# Patient Record
Sex: FEMALE | Race: AMERICAN INDIAN OR ALASKA NATIVE
[De-identification: names, ages, dates, MRNs, and addresses within clinical notes are randomized per-mention and may not be internally consistent; named-entity substitution may affect disease eponyms.]

---

## 2018-06-13 ENCOUNTER — HOSPITAL ENCOUNTER (INPATIENT)
Dept: HOSPITAL 5 - ED | Age: 53
LOS: 7 days | Discharge: HOME | DRG: 557 | End: 2018-06-20
Attending: INTERNAL MEDICINE | Admitting: INTERNAL MEDICINE
Payer: COMMERCIAL

## 2018-06-13 DIAGNOSIS — R56.9: ICD-10-CM

## 2018-06-13 DIAGNOSIS — M62.82: Primary | ICD-10-CM

## 2018-06-13 DIAGNOSIS — E87.0: ICD-10-CM

## 2018-06-13 DIAGNOSIS — D72.829: ICD-10-CM

## 2018-06-13 DIAGNOSIS — F20.3: ICD-10-CM

## 2018-06-13 DIAGNOSIS — N30.00: ICD-10-CM

## 2018-06-13 DIAGNOSIS — G93.40: ICD-10-CM

## 2018-06-13 DIAGNOSIS — E78.5: ICD-10-CM

## 2018-06-13 DIAGNOSIS — Z83.3: ICD-10-CM

## 2018-06-13 DIAGNOSIS — Z82.49: ICD-10-CM

## 2018-06-13 DIAGNOSIS — F17.200: ICD-10-CM

## 2018-06-13 DIAGNOSIS — F31.9: ICD-10-CM

## 2018-06-13 DIAGNOSIS — R65.10: ICD-10-CM

## 2018-06-13 DIAGNOSIS — R41.0: ICD-10-CM

## 2018-06-13 DIAGNOSIS — R00.0: ICD-10-CM

## 2018-06-13 DIAGNOSIS — I11.0: ICD-10-CM

## 2018-06-13 LAB
ALBUMIN SERPL-MCNC: 4 G/DL (ref 3.9–5)
ALT SERPL-CCNC: 43 UNITS/L (ref 7–56)
BACTERIA #/AREA URNS HPF: (no result) /HPF
BASOPHILS # (AUTO): 0 K/MM3 (ref 0–0.1)
BASOPHILS NFR BLD AUTO: 0.3 % (ref 0–1.8)
BILIRUB UR QL STRIP: (no result)
BLOOD UR QL VISUAL: (no result)
BUN SERPL-MCNC: 37 MG/DL (ref 7–17)
BUN/CREAT SERPL: 31 %
CALCIUM SERPL-MCNC: 9.3 MG/DL (ref 8.4–10.2)
CRP SERPL-MCNC: 0.9 MG/DL (ref 0–1.3)
EOSINOPHIL # BLD AUTO: 0 K/MM3 (ref 0–0.4)
EOSINOPHIL NFR BLD AUTO: 0 % (ref 0–4.3)
GRAN CASTS #/AREA URNS LPF: 3 /LPF
HCT VFR BLD CALC: 39.5 % (ref 30.3–42.9)
HEMOLYSIS INDEX: 32
HGB BLD-MCNC: 12.9 GM/DL (ref 10.1–14.3)
INR PPP: 1.16 (ref 0.87–1.13)
LYMPHOCYTES # BLD AUTO: 1.3 K/MM3 (ref 1.2–5.4)
LYMPHOCYTES NFR BLD AUTO: 7.7 % (ref 13.4–35)
MCH RBC QN AUTO: 31 PG (ref 28–32)
MCHC RBC AUTO-ENTMCNC: 33 % (ref 30–34)
MCV RBC AUTO: 94 FL (ref 79–97)
MONOCYTES # (AUTO): 1.3 K/MM3 (ref 0–0.8)
MONOCYTES % (AUTO): 7.6 % (ref 0–7.3)
MUCOUS THREADS #/AREA URNS HPF: (no result) /HPF
PH UR STRIP: 5 [PH] (ref 5–7)
PLATELET # BLD: 475 K/MM3 (ref 140–440)
PROT UR STRIP-MCNC: >500 MG/DL
RBC # BLD AUTO: 4.19 M/MM3 (ref 3.65–5.03)
RBC #/AREA URNS HPF: 3 /HPF (ref 0–6)
UROBILINOGEN UR-MCNC: < 2 MG/DL (ref ?–2)
WBC #/AREA URNS HPF: 8 /HPF (ref 0–6)

## 2018-06-13 PROCEDURE — 70450 CT HEAD/BRAIN W/O DYE: CPT

## 2018-06-13 PROCEDURE — 82553 CREATINE MB FRACTION: CPT

## 2018-06-13 PROCEDURE — 82805 BLOOD GASES W/O2 SATURATION: CPT

## 2018-06-13 PROCEDURE — 82140 ASSAY OF AMMONIA: CPT

## 2018-06-13 PROCEDURE — 71045 X-RAY EXAM CHEST 1 VIEW: CPT

## 2018-06-13 PROCEDURE — G0480 DRUG TEST DEF 1-7 CLASSES: HCPCS

## 2018-06-13 PROCEDURE — 82947 ASSAY GLUCOSE BLOOD QUANT: CPT

## 2018-06-13 PROCEDURE — 86140 C-REACTIVE PROTEIN: CPT

## 2018-06-13 PROCEDURE — 82550 ASSAY OF CK (CPK): CPT

## 2018-06-13 PROCEDURE — 96361 HYDRATE IV INFUSION ADD-ON: CPT

## 2018-06-13 PROCEDURE — 84484 ASSAY OF TROPONIN QUANT: CPT

## 2018-06-13 PROCEDURE — 80178 ASSAY OF LITHIUM: CPT

## 2018-06-13 PROCEDURE — 87040 BLOOD CULTURE FOR BACTERIA: CPT

## 2018-06-13 PROCEDURE — 84160 ASSAY OF PROTEIN ANY SOURCE: CPT

## 2018-06-13 PROCEDURE — 80048 BASIC METABOLIC PNL TOTAL CA: CPT

## 2018-06-13 PROCEDURE — 82803 BLOOD GASES ANY COMBINATION: CPT

## 2018-06-13 PROCEDURE — 80053 COMPREHEN METABOLIC PANEL: CPT

## 2018-06-13 PROCEDURE — 85027 COMPLETE CBC AUTOMATED: CPT

## 2018-06-13 PROCEDURE — 80320 DRUG SCREEN QUANTALCOHOLS: CPT

## 2018-06-13 PROCEDURE — 85652 RBC SED RATE AUTOMATED: CPT

## 2018-06-13 PROCEDURE — 96365 THER/PROPH/DIAG IV INF INIT: CPT

## 2018-06-13 PROCEDURE — 86592 SYPHILIS TEST NON-TREP QUAL: CPT

## 2018-06-13 PROCEDURE — 83735 ASSAY OF MAGNESIUM: CPT

## 2018-06-13 PROCEDURE — 89051 BODY FLUID CELL COUNT: CPT

## 2018-06-13 PROCEDURE — 82962 GLUCOSE BLOOD TEST: CPT

## 2018-06-13 PROCEDURE — 4A033R1 MEASUREMENT OF ARTERIAL SATURATION, PERIPHERAL, PERCUTANEOUS APPROACH: ICD-10-PCS | Performed by: INTERNAL MEDICINE

## 2018-06-13 PROCEDURE — 96367 TX/PROPH/DG ADDL SEQ IV INF: CPT

## 2018-06-13 PROCEDURE — 74176 CT ABD & PELVIS W/O CONTRAST: CPT

## 2018-06-13 PROCEDURE — 95819 EEG AWAKE AND ASLEEP: CPT

## 2018-06-13 PROCEDURE — 74018 RADEX ABDOMEN 1 VIEW: CPT

## 2018-06-13 PROCEDURE — 85025 COMPLETE CBC W/AUTO DIFF WBC: CPT

## 2018-06-13 PROCEDURE — 87086 URINE CULTURE/COLONY COUNT: CPT

## 2018-06-13 PROCEDURE — 87116 MYCOBACTERIA CULTURE: CPT

## 2018-06-13 PROCEDURE — 81001 URINALYSIS AUTO W/SCOPE: CPT

## 2018-06-13 PROCEDURE — 93005 ELECTROCARDIOGRAM TRACING: CPT

## 2018-06-13 PROCEDURE — 93010 ELECTROCARDIOGRAM REPORT: CPT

## 2018-06-13 PROCEDURE — 86403 PARTICLE AGGLUT ANTBDY SCRN: CPT

## 2018-06-13 PROCEDURE — 96375 TX/PRO/DX INJ NEW DRUG ADDON: CPT

## 2018-06-13 PROCEDURE — 36415 COLL VENOUS BLD VENIPUNCTURE: CPT

## 2018-06-13 PROCEDURE — 85610 PROTHROMBIN TIME: CPT

## 2018-06-13 NOTE — CAT SCAN REPORT
FINAL REPORT



PROCEDURE:  CT abdomen and pelvis without contrast. 



TECHNIQUE:  Computerized axial tomography of the abdomen and

pelvis was performed without intravenous contrast. This study is

performed without intravascular contrast material and its

sensitivity for abdominal and pelvic pathology, including

neoplasms, inflammation, abscess, free fluid, thrombosis,

arterial dissection and infarction, is reduced compared with a

contrast enhanced study. 



HISTORY:  Altered mental status. 



COMPARISON:  No prior studies are available for comparison.



FINDINGS:  

The lung bases are clear. There are no pleural effusions. The

heart size is normal. The liver, pancreas and spleen are grossly

normal. The gallbladder is present. The adrenal glands are not

enlarged. Both kidneys appear normal in size and configuration.

The abdominal aorta has a normal caliber. There is no

retroperitoneal adenopathy. The unopacified gastrointestinal

tract is unremarkable. The appendix is not definitely visualized.

The bladder is unremarkable. The uterus appears enlarged. There

are some calcifications in the uterus suggesting fibroid disease.

The regional skeleton appears intact. 



IMPRESSION:  

Enlarged uterus with probable leiomyomas. No definite signs of

acute disease in the abdomen or pelvis.

## 2018-06-13 NOTE — HISTORY AND PHYSICAL REPORT
History of Present Illness


Date of examination: 06/13/18


History of present illness: 


53 year old woman with bipolar, schizophrenia was brought to the ER for 

evaluation of altered mental status. No further history is available. A lumbar 

puncture was done and the patient was treat emperically for meningitis and 

possible NMS, however poison control recommend supportive care. A ROS is 

unobtainable


EMS reported that patient is from a group home, unknown baseline mental status





PAST MEDICAL HISTORY: hypertension, diabetes, hyperlipidemia, congestive heart 

failure





PAST SURGICAL HISTORY: Aicd, back, bowel resection, b/l feet





SOCIAL HISTORY: Denies tobacco, alcohol, drugs





FAMILY HISTORY: Hypertension, diabetes

















Medications and Allergies


 Allergies











Allergy/AdvReac Type Severity Reaction Status Date / Time


 


Unable to Assess Allergy   Unverified 06/13/18 20:11











 Home Medications











 Medication  Instructions  Recorded  Confirmed  Last Taken  Type


 


LORazepam [Ativan] 1 mg PO TID 06/13/18 06/13/18 Unknown History


 


risperiDONE [Risperdal] 2 mg PO BID 06/13/18 06/13/18 Unknown History











Active Meds: 


Active Medications





Sodium Chloride (Sodium Chloride Flush Syringe 10 Ml)  10 ml IV PRN PRN


   PRN Reason: LINE FLUSH


Vancomycin HCl (Vancomycin Pharmacy To Dose)  1 each IV PKCONSULT JON











Exam





- Physical Exam


Narrative exam: 


Gen. appearance: Patient lying in bed, no apparent distress


HEENT: Normocephalic, atraumatic, pupils equally round and reactive to light, 

pinpoint, unable to do extraocular movement, and no sclericterus,. No JVD or 

thyromegaly or nodule,neck supple, no carotid bruit ,mucous membranes moist, no 

exudate or erythema


Heart: S1, S2, regular rate and rhythm


Lungs: Clear anteriorly bilaterally, breathing comfortable


Abdomen: Positive bowel sounds, soft, nondistended, no organomegaly


Extremity:no edema,  cyanosis, clubbing


Neuro: sedated



































- Constitutional


Vitals: 


 











Temp Pulse Resp BP Pulse Ox


 


 98.4 F   122 H  13   131/84   100 


 


 06/13/18 23:30  06/13/18 23:30  06/13/18 23:30  06/13/18 23:15  06/13/18 23:30














Results





- Labs


CBC & Chem 7: 


 06/13/18 20:26





 06/13/18 20:26


Labs: 


 Abnormal lab results











  06/13/18 06/13/18 06/13/18 Range/Units





  20:05 20:26 20:26 


 


WBC   17.1 H   (4.5-11.0)  K/mm3


 


RDW   12.9 L   (13.2-15.2)  %


 


Plt Count   475 H   (140-440)  K/mm3


 


Lymph % (Auto)   7.7 L   (13.4-35.0)  %


 


Mono % (Auto)   7.6 H   (0.0-7.3)  %


 


Mono #   1.3 H   (0.0-0.8)  K/mm3


 


Seg Neutrophils %   84.4 H   (40.0-70.0)  %


 


Seg Neutrophils #   14.5 H   (1.8-7.7)  K/mm3


 


PT    15.4 H  (12.2-14.9)  Sec.


 


INR    1.16 H  (0.87-1.13)  


 


POC ABG pH     (7.35-7.45)  


 


POC ABG pCO2     (35-45)  


 


Sodium     (137-145)  mmol/L


 


Chloride     ()  mmol/L


 


Carbon Dioxide     (22-30)  mmol/L


 


BUN     (7-17)  mg/dL


 


Glucose     ()  mg/dL


 


Lactic Acid     (0.7-2.0)  mmol/L


 


Magnesium     (1.7-2.3)  mg/dL


 


AST     (5-40)  units/L


 


Total Creatine Kinase     ()  units/L


 


Urine WBC (Auto)  8.0 H    (0.0-6.0)  /HPF


 


Salicylates     (2.8-20.0)  mg/dL


 


Acetaminophen     (10.0-30.0)  ug/mL














  06/13/18 06/13/18 06/13/18 Range/Units





  20:26 20:26 20:53 


 


WBC     (4.5-11.0)  K/mm3


 


RDW     (13.2-15.2)  %


 


Plt Count     (140-440)  K/mm3


 


Lymph % (Auto)     (13.4-35.0)  %


 


Mono % (Auto)     (0.0-7.3)  %


 


Mono #     (0.0-0.8)  K/mm3


 


Seg Neutrophils %     (40.0-70.0)  %


 


Seg Neutrophils #     (1.8-7.7)  K/mm3


 


PT     (12.2-14.9)  Sec.


 


INR     (0.87-1.13)  


 


POC ABG pH     (7.35-7.45)  


 


POC ABG pCO2     (35-45)  


 


Sodium  151 H    (137-145)  mmol/L


 


Chloride  112.5 H    ()  mmol/L


 


Carbon Dioxide  21 L    (22-30)  mmol/L


 


BUN  37 H    (7-17)  mg/dL


 


Glucose  159 H    ()  mg/dL


 


Lactic Acid   3.10 H*   (0.7-2.0)  mmol/L


 


Magnesium    2.40 H  (1.7-2.3)  mg/dL


 


AST  113 H    (5-40)  units/L


 


Total Creatine Kinase    5716 H  ()  units/L


 


Urine WBC (Auto)     (0.0-6.0)  /HPF


 


Salicylates     (2.8-20.0)  mg/dL


 


Acetaminophen     (10.0-30.0)  ug/mL














  06/13/18 06/13/18 06/13/18 Range/Units





  20:53 20:53 21:10 


 


WBC     (4.5-11.0)  K/mm3


 


RDW     (13.2-15.2)  %


 


Plt Count     (140-440)  K/mm3


 


Lymph % (Auto)     (13.4-35.0)  %


 


Mono % (Auto)     (0.0-7.3)  %


 


Mono #     (0.0-0.8)  K/mm3


 


Seg Neutrophils %     (40.0-70.0)  %


 


Seg Neutrophils #     (1.8-7.7)  K/mm3


 


PT     (12.2-14.9)  Sec.


 


INR     (0.87-1.13)  


 


POC ABG pH    7.460 H  (7.35-7.45)  


 


POC ABG pCO2    32.3 L  (35-45)  


 


Sodium     (137-145)  mmol/L


 


Chloride     ()  mmol/L


 


Carbon Dioxide     (22-30)  mmol/L


 


BUN     (7-17)  mg/dL


 


Glucose     ()  mg/dL


 


Lactic Acid     (0.7-2.0)  mmol/L


 


Magnesium     (1.7-2.3)  mg/dL


 


AST     (5-40)  units/L


 


Total Creatine Kinase     ()  units/L


 


Urine WBC (Auto)     (0.0-6.0)  /HPF


 


Salicylates  < 0.3 L    (2.8-20.0)  mg/dL


 


Acetaminophen   < 5.0 L   (10.0-30.0)  ug/mL














- Imaging and Cardiology


Chest x-ray: image reviewed


Abdominal x-ray: report reviewed


CT Scan - head: report reviewed


CT scan - pelvis: report reviewed





Assessment and Plan


Assessment


SIRS


Encephalopathy


Rhabdomyolysis


Hypernatremia


Bipolar


Schizophrenia





Plan


Admit to medicine


Start IV antibiotic, follow cultures


Start aggressive IV fluid


monitor cardiac enzymes


Hold further sedatives for now, s/p propofol and ativan in ER, she  is very 

sedated


consult critical care


DVT prophalaxis

## 2018-06-13 NOTE — XRAY REPORT
FINAL REPORT



PROCEDURE:  Chest. 



TECHNIQUE:  Portable AP view. 



HISTORY:  Possible sepsis. 



COMPARISON:  No prior studies are available for comparison.



FINDINGS:  

The heart and mediastinum appear normal. The lungs are clear and

well expanded. There are no pleural effusions. The soft tissues

and regional skeleton are unremarkable. 



IMPRESSION:  

Negative portable chest.

## 2018-06-13 NOTE — CAT SCAN REPORT
FINAL REPORT



PROCEDURE:  CT head without contrast. 



TECHNIQUE:  Computerized tomography of the head was performed

without contrast material.



HISTORY:  Altered mental status. 



COMPARISON:  No prior studies are available for comparison.



FINDINGS:  

There is mild cerebral atrophy. The gray matter and white matter

appear normal. There are no mass lesions. There is no

intracranial hemorrhage. The calvarium appears intact. There is

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 The paranasal

sinuses and visualized mastoid air cells are clear. 



IMPRESSION:  

Normal study of the brain for age.

## 2018-06-13 NOTE — EMERGENCY DEPARTMENT REPORT
ED General Adult HPI





- General


Chief complaint: Fever


Stated complaint: AMS


Time Seen by Provider: 06/13/18 20:32


Source: EMS (ems notes not available at time of  chart dictation), RN notes 

reviewed


Mode of arrival: Stretcher


Limitations: Altered Mental Status





- History of Present Illness


Initial comments: 





This is a 53-year-old female, unknown to this provider previously, brought to 

the hospital by EMS for complaint of altered mental status.  EMS staff not 

available for history.  No family available for history or collateral 

information.  Patient altered, he cannot describe exacerbating or relieving 

factors.  The patient is awake, does not speak, does not follow commands, and 

occasionally moans when she is examined.





Medications include lorazepam and Risperdal.


-: unknown


Radiation: other (per hpi)


Quality: other (per hpi)


Consistency: other (per hpi)


Improves with: other (per hpi)


Worsens with: other (per hpi)


Associated Symptoms: other (per hpi)


Treatments Prior to Arrival: other (per hpi)





- Related Data


 Home Medications











 Medication  Instructions  Recorded  Confirmed  Last Taken


 


LORazepam [Ativan] 1 mg PO TID 06/13/18 06/13/18 Unknown


 


risperiDONE [Risperdal] 2 mg PO BID 06/13/18 06/13/18 Unknown











 Allergies











Allergy/AdvReac Type Severity Reaction Status Date / Time


 


Unable to Assess Allergy   Unverified 06/13/18 20:11














ED Review of Systems


ROS: 


Stated complaint: AMS


Other details as noted in HPI





Comment: Unobtainable due to pts medical conditions





ED Past Medical Hx





- Past Medical History


Hx Psychiatric Treatment: Yes (schizephronia, bipolar)





- Surgical History


Past Surgical History?: No





- Social History


Smoking Status: Never Smoker





- Medications


Home Medications: 


 Home Medications











 Medication  Instructions  Recorded  Confirmed  Last Taken  Type


 


LORazepam [Ativan] 1 mg PO TID 06/13/18 06/13/18 Unknown History


 


risperiDONE [Risperdal] 2 mg PO BID 06/13/18 06/13/18 Unknown History














ED Physical Exam





- General


Limitations: Altered Mental Status


General appearance: lethargic





- Head


Head exam: Present: atraumatic, normocephalic





- Eye


Eye exam: Present: PERRL





- ENT


ENT exam: Present: mucous membranes dry





- Neck


Neck exam: Present: normal inspection.  Absent: tenderness, meningismus





- Respiratory


Respiratory exam: Absent: respiratory distress, wheezes, rales, rhonchi, stridor





- Cardiovascular


Cardiovascular Exam: Present: normal rhythm, tachycardia, normal heart sounds





- GI/Abdominal


GI/Abdominal exam: Present: soft, normal bowel sounds.  Absent: distended, 

tenderness, guarding, rebound, rigid, pulsatile mass





- Rectal


Rectal exam: Present: normal inspection





- Extremities Exam


Extremities exam: Absent: normal inspection, tenderness, pedal edema, calf 

tenderness, other (compartments are soft.  2+ pulses noted in the upper, lower 

extremities.  There is no clonus in the lower extremities.  There is 

hyperreflexia in the upper and lower extremities.  On the left lateral thigh, 

proximally, there is an area of tenderness and erythema, 4 x 6 cm, possible 

induration, no obvious abscess or fluctuance)





ED Course


 Vital Signs











  06/13/18 06/13/18 06/13/18





  19:58 20:00 20:06


 


Temperature   99.9 F H


 


Temperature [   





Intra-Procedure   





]   


 


Temperature [   





Post-Procedure]   


 


Temperature [   





Pre-Procedure]   


 


Pulse Rate 137 H 138 H 146 H


 


Pulse Rate [   





Intra-Procedure   





]   


 


Pulse Rate [   





Post-Procedure]   


 


Pulse Rate [Pre   





-Procedure]   


 


Respiratory 19 14 24





Rate   


 


Respiratory   





Rate [Intra-   





Procedure]   


 


Respiratory   





Rate [Post-   





Procedure]   


 


Respiratory   





Rate [Pre-   





Procedure]   


 


Blood Pressure  156/105 142/112


 


Blood Pressure   





[Intra-   





Procedure]   


 


Blood Pressure   





[Pre-Procedure]   


 


O2 Sat by Pulse   





Oximetry   


 


O2 Sat by Pulse   





Oximetry [   





Intra-Procedure   





]   


 


O2 Sat by Pulse   





Oximetry [Post   





-Procedure]   


 


O2 Sat by Pulse   





Oximetry [Pre-   





Procedure]   














  06/13/18 06/13/18 06/13/18





  20:10 20:15 20:30


 


Temperature   


 


Temperature [   





Intra-Procedure   





]   


 


Temperature [   





Post-Procedure]   


 


Temperature [   





Pre-Procedure]   


 


Pulse Rate  132 H 135 H


 


Pulse Rate [   





Intra-Procedure   





]   


 


Pulse Rate [   





Post-Procedure]   


 


Pulse Rate [Pre   





-Procedure]   


 


Respiratory 22 15 15





Rate   


 


Respiratory   





Rate [Intra-   





Procedure]   


 


Respiratory   





Rate [Post-   





Procedure]   


 


Respiratory   





Rate [Pre-   





Procedure]   


 


Blood Pressure  159/100 159/98


 


Blood Pressure   





[Intra-   





Procedure]   


 


Blood Pressure   





[Pre-Procedure]   


 


O2 Sat by Pulse   





Oximetry   


 


O2 Sat by Pulse   





Oximetry [   





Intra-Procedure   





]   


 


O2 Sat by Pulse   





Oximetry [Post   





-Procedure]   


 


O2 Sat by Pulse   





Oximetry [Pre-   





Procedure]   














  06/13/18 06/13/18 06/13/18





  20:40 20:45 21:00


 


Temperature   


 


Temperature [   





Intra-Procedure   





]   


 


Temperature [   





Post-Procedure]   


 


Temperature [   





Pre-Procedure]   


 


Pulse Rate  135 H 138 H


 


Pulse Rate [   





Intra-Procedure   





]   


 


Pulse Rate [   





Post-Procedure]   


 


Pulse Rate [Pre   





-Procedure]   


 


Respiratory  13 13





Rate   


 


Respiratory   





Rate [Intra-   





Procedure]   


 


Respiratory   





Rate [Post-   





Procedure]   


 


Respiratory   





Rate [Pre-   





Procedure]   


 


Blood Pressure  134/94 127/92


 


Blood Pressure   





[Intra-   





Procedure]   


 


Blood Pressure   





[Pre-Procedure]   


 


O2 Sat by Pulse 98  





Oximetry   


 


O2 Sat by Pulse   





Oximetry [   





Intra-Procedure   





]   


 


O2 Sat by Pulse   





Oximetry [Post   





-Procedure]   


 


O2 Sat by Pulse   





Oximetry [Pre-   





Procedure]   














  06/13/18 06/13/18 06/13/18





  21:10 21:15 21:43


 


Temperature   98.4 F


 


Temperature [   





Intra-Procedure   





]   


 


Temperature [   





Post-Procedure]   


 


Temperature [   





Pre-Procedure]   


 


Pulse Rate  140 H 


 


Pulse Rate [   





Intra-Procedure   





]   


 


Pulse Rate [   





Post-Procedure]   


 


Pulse Rate [Pre   





-Procedure]   


 


Respiratory  18 





Rate   


 


Respiratory   





Rate [Intra-   





Procedure]   


 


Respiratory   





Rate [Post-   





Procedure]   


 


Respiratory   





Rate [Pre-   





Procedure]   


 


Blood Pressure  133/97 


 


Blood Pressure   





[Intra-   





Procedure]   


 


Blood Pressure   





[Pre-Procedure]   


 


O2 Sat by Pulse 100  100





Oximetry   


 


O2 Sat by Pulse   





Oximetry [   





Intra-Procedure   





]   


 


O2 Sat by Pulse   





Oximetry [Post   





-Procedure]   


 


O2 Sat by Pulse   





Oximetry [Pre-   





Procedure]   














  06/13/18 06/13/18 06/13/18





  22:45 22:57 23:01


 


Temperature   


 


Temperature [   





Intra-Procedure   





]   


 


Temperature [   





Post-Procedure]   


 


Temperature [  98.4 F 





Pre-Procedure]   


 


Pulse Rate 141 H  140 H


 


Pulse Rate [   





Intra-Procedure   





]   


 


Pulse Rate [   





Post-Procedure]   


 


Pulse Rate [Pre  145 H 





-Procedure]   


 


Respiratory 12  19





Rate   


 


Respiratory   





Rate [Intra-   





Procedure]   


 


Respiratory   





Rate [Post-   





Procedure]   


 


Respiratory  16 





Rate [Pre-   





Procedure]   


 


Blood Pressure 132/90  117/68


 


Blood Pressure   





[Intra-   





Procedure]   


 


Blood Pressure  132/90 





[Pre-Procedure]   


 


O2 Sat by Pulse   





Oximetry   


 


O2 Sat by Pulse   





Oximetry [   





Intra-Procedure   





]   


 


O2 Sat by Pulse   





Oximetry [Post   





-Procedure]   


 


O2 Sat by Pulse  100 





Oximetry [Pre-   





Procedure]   














  06/13/18 06/13/18 06/13/18





  23:05 23:10 23:15


 


Temperature   


 


Temperature [ 98.2 F 98.2 F 98.2 F





Intra-Procedure   





]   


 


Temperature [   





Post-Procedure]   


 


Temperature [   





Pre-Procedure]   


 


Pulse Rate   136 H


 


Pulse Rate [ 132 H 129 H 131 H





Intra-Procedure   





]   


 


Pulse Rate [   





Post-Procedure]   


 


Pulse Rate [Pre   





-Procedure]   


 


Respiratory   13





Rate   


 


Respiratory 13 13 14





Rate [Intra-   





Procedure]   


 


Respiratory   





Rate [Post-   





Procedure]   


 


Respiratory   





Rate [Pre-   





Procedure]   


 


Blood Pressure   131/84


 


Blood Pressure 123/68 112/58 131/84





[Intra-   





Procedure]   


 


Blood Pressure   





[Pre-Procedure]   


 


O2 Sat by Pulse   





Oximetry   


 


O2 Sat by Pulse 100 100 100





Oximetry [   





Intra-Procedure   





]   


 


O2 Sat by Pulse   





Oximetry [Post   





-Procedure]   


 


O2 Sat by Pulse   





Oximetry [Pre-   





Procedure]   














  06/13/18 06/13/18 06/13/18





  23:30 23:43 23:45


 


Temperature   


 


Temperature [   





Intra-Procedure   





]   


 


Temperature [ 98.4 F  





Post-Procedure]   


 


Temperature [   





Pre-Procedure]   


 


Pulse Rate 122 H 122 H 122 H


 


Pulse Rate [   





Intra-Procedure   





]   


 


Pulse Rate [ 122 H  





Post-Procedure]   


 


Pulse Rate [Pre   





-Procedure]   


 


Respiratory 12 15 15





Rate   


 


Respiratory   





Rate [Intra-   





Procedure]   


 


Respiratory 13  





Rate [Post-   





Procedure]   


 


Respiratory   





Rate [Pre-   





Procedure]   


 


Blood Pressure 123/71 106/66 107/65


 


Blood Pressure   





[Intra-   





Procedure]   


 


Blood Pressure   





[Pre-Procedure]   


 


O2 Sat by Pulse   





Oximetry   


 


O2 Sat by Pulse   





Oximetry [   





Intra-Procedure   





]   


 


O2 Sat by Pulse 100  





Oximetry [Post   





-Procedure]   


 


O2 Sat by Pulse   





Oximetry [Pre-   





Procedure]   














  06/14/18 06/14/18 06/14/18





  00:01 00:16 00:31


 


Temperature   


 


Temperature [   





Intra-Procedure   





]   


 


Temperature [   





Post-Procedure]   


 


Temperature [   





Pre-Procedure]   


 


Pulse Rate 118 H 128 H 121 H


 


Pulse Rate [   





Intra-Procedure   





]   


 


Pulse Rate [   





Post-Procedure]   


 


Pulse Rate [Pre   





-Procedure]   


 


Respiratory 12 17 12





Rate   


 


Respiratory   





Rate [Intra-   





Procedure]   


 


Respiratory   





Rate [Post-   





Procedure]   


 


Respiratory   





Rate [Pre-   





Procedure]   


 


Blood Pressure 120/81 120/81 129/85


 


Blood Pressure   





[Intra-   





Procedure]   


 


Blood Pressure   





[Pre-Procedure]   


 


O2 Sat by Pulse   





Oximetry   


 


O2 Sat by Pulse   





Oximetry [   





Intra-Procedure   





]   


 


O2 Sat by Pulse   





Oximetry [Post   





-Procedure]   


 


O2 Sat by Pulse   





Oximetry [Pre-   





Procedure]   














  06/14/18





  00:45


 


Temperature 97.5 F L


 


Temperature [ 





Intra-Procedure 





] 


 


Temperature [ 





Post-Procedure] 


 


Temperature [ 





Pre-Procedure] 


 


Pulse Rate 123 H


 


Pulse Rate [ 





Intra-Procedure 





] 


 


Pulse Rate [ 





Post-Procedure] 


 


Pulse Rate [Pre 





-Procedure] 


 


Respiratory 11 L





Rate 


 


Respiratory 





Rate [Intra- 





Procedure] 


 


Respiratory 





Rate [Post- 





Procedure] 


 


Respiratory 





Rate [Pre- 





Procedure] 


 


Blood Pressure 122/73


 


Blood Pressure 





[Intra- 





Procedure] 


 


Blood Pressure 





[Pre-Procedure] 


 


O2 Sat by Pulse 100





Oximetry 


 


O2 Sat by Pulse 





Oximetry [ 





Intra-Procedure 





] 


 


O2 Sat by Pulse 





Oximetry [Post 





-Procedure] 


 


O2 Sat by Pulse 





Oximetry [Pre- 





Procedure] 














- Reevaluation(s)


Reevaluation #1: 





06/13/18 22:21


Differential diagnosis, including not limited to: Pneumonia, bacteremia, intra-

abdominal infection, myositis, cellulitis, urinary tract infection, neuroleptic 

malignant syndrome, dehydration, rhabdomyolysis














Assessment and plan: 53-year-old female apparently on Risperdal, with 

hyperreflexia, hypernatremia, tachycardia, leukocytosis, concerning for 

bacteremic infection versus side effect of medication.  The patient will be 

aggressively treated for all of the aforementioned.  No obvious source of 

infection is elucidated at this time, no family is available at this time for 

collateral information or history, given altered mental status leukocytosis and 

tachycardia, the patient will be treated empirically for community-acquired 

meningitis and encephalitis, and she will receive administrative consent from 

this provider for both a spinal tap and moderate sedation to acquire CSF.





I will also discuss with the [facility].  Nursing staff 

initially contacted them for recommendations, and the recommendations are 

appreciated.  It is mild pain at the patient should have broad-spectrum 

antibiotics until bacteremia and meningitis can be ruled out.

















Reevaluation #2: 





06/13/18 23:26


Patient is found to be hypernatremic, dehydrated, hypermagnesemic, and in 

rhabdomyolysis with a CK of 6000.  CT scan of the brain is negative, CT scan of 

the abdomen and pelvis is negative, the case is rediscussed with the [facility] 

[facility], Mr. AMAYA Hankmelva... [facility] agrees 

that neuroleptic malignant syndrome is the most likely diagnosis.  They do not 

recommend bromocriptine at this time.  They do recommend Ativan, fluids and 

supportive care.  They will follow in consultation and as needed.  Critical 

care physician on-call was paged.  The hospital physician on callto arrange 

admission.


Reevaluation #3: 





06/13/18 23:31


d/w Dr Waggoner, she agrees with placement to the intensive care unit.  She will 

follow in consultation.


Reevaluation #4: 





06/13/18 23:36


dr rosas accepts to her service





- Lumbar Puncture


Consent Obtained: emergent situation


Time Out Performed: Yes


Indication for Procedure: change in mental status


Patient Position: left lateral decubitus


Skin Prep: Povidone-Iodine 1%


Local Anesthetic Used: Lidocaine 1%


Amount of anesthesia used (mls): 10


Spinal Needle Gauge: Other (18 g)


Spinal Needle Length: 3.5in


Interspace Used: L4-L5


Fluid Initially Obtained: clear


Complications: none


Patient Tolerated Procedure: well





- Moderate Sedation


Indications: diagnostic imaging proced


ASA Class: III


Mallampati Airway Score: 2


Preparation: cardiac monitor applied, pulse oximeter, capnometry used, 

supplemental O2 applied, reversal agents at bedside, suction/airway equipment 

at bedside, IV secured


IV Propofol Dose (mgs): 50


Complications: none


Patient Tolerated Procedure: well


Additional Comments: 














sedation time from 11 pm to 1113 pm.





ED Medical Decision Making





- Lab Data


Result diagrams: 


 06/16/18 05:23





 06/16/18 05:23








 Vital Signs











  06/13/18 06/13/18 06/13/18





  19:58 20:00 20:06


 


Temperature   99.9 F H


 


Pulse Rate 137 H 138 H 146 H


 


Respiratory 19 14 24





Rate   


 


Blood Pressure  156/105 142/112


 


O2 Sat by Pulse   





Oximetry   














  06/13/18 06/13/18 06/13/18





  20:10 20:15 20:30


 


Temperature   


 


Pulse Rate  132 H 135 H


 


Respiratory 22 15 15





Rate   


 


Blood Pressure  159/100 159/98


 


O2 Sat by Pulse   





Oximetry   














  06/13/18 06/13/18 06/13/18





  20:40 20:45 21:00


 


Temperature   


 


Pulse Rate  135 H 138 H


 


Respiratory  13 13





Rate   


 


Blood Pressure  134/94 127/92


 


O2 Sat by Pulse 98  





Oximetry   














  06/13/18 06/13/18 06/13/18





  21:10 21:15 21:43


 


Temperature   98.4 F


 


Pulse Rate  140 H 


 


Respiratory  18 





Rate   


 


Blood Pressure  133/97 


 


O2 Sat by Pulse 100  100





Oximetry   











 Lab Results











  06/13/18 06/13/18 06/13/18 Range/Units





  20:05 20:26 20:26 


 


WBC   17.1 H   (4.5-11.0)  K/mm3


 


RBC   4.19   (3.65-5.03)  M/mm3


 


Hgb   12.9   (10.1-14.3)  gm/dl


 


Hct   39.5   (30.3-42.9)  %


 


MCV   94   (79-97)  fl


 


MCH   31   (28-32)  pg


 


MCHC   33   (30-34)  %


 


RDW   12.9 L   (13.2-15.2)  %


 


Plt Count   475 H   (140-440)  K/mm3


 


Lymph % (Auto)   7.7 L   (13.4-35.0)  %


 


Mono % (Auto)   7.6 H   (0.0-7.3)  %


 


Eos % (Auto)   0.0   (0.0-4.3)  %


 


Baso % (Auto)   0.3   (0.0-1.8)  %


 


Lymph #   1.3   (1.2-5.4)  K/mm3


 


Mono #   1.3 H   (0.0-0.8)  K/mm3


 


Eos #   0.0   (0.0-0.4)  K/mm3


 


Baso #   0.0   (0.0-0.1)  K/mm3


 


Seg Neutrophils %   84.4 H   (40.0-70.0)  %


 


Seg Neutrophils #   14.5 H   (1.8-7.7)  K/mm3


 


ESR     (0-20)  mm/Hr


 


PT    15.4 H  (12.2-14.9)  Sec.


 


INR    1.16 H  (0.87-1.13)  


 


VBG pH     (7.320-7.420)  


 


Sodium     (137-145)  mmol/L


 


Potassium     (3.6-5.0)  mmol/L


 


Chloride     ()  mmol/L


 


Carbon Dioxide     (22-30)  mmol/L


 


Anion Gap     mmol/L


 


BUN     (7-17)  mg/dL


 


Creatinine     (0.7-1.2)  mg/dL


 


Estimated GFR     ml/min


 


BUN/Creatinine Ratio     %


 


Glucose     ()  mg/dL


 


Lactic Acid     (0.7-2.0)  mmol/L


 


Calcium     (8.4-10.2)  mg/dL


 


Magnesium     (1.7-2.3)  mg/dL


 


Total Bilirubin     (0.1-1.2)  mg/dL


 


AST     (5-40)  units/L


 


ALT     (7-56)  units/L


 


Alkaline Phosphatase     ()  units/L


 


Total Creatine Kinase     ()  units/L


 


C-Reactive Protein     (0.00-1.30)  mg/dL


 


Total Protein     (6.3-8.2)  g/dL


 


Albumin     (3.9-5)  g/dL


 


Albumin/Globulin Ratio     %


 


Urine Color  Yellow    (Yellow)  


 


Urine Turbidity  Clear    (Clear)  


 


Urine pH  5.0    (5.0-7.0)  


 


Ur Specific Gravity  1.028    (1.003-1.030)  


 


Urine Protein  >500    (Negative)  mg/dL


 


Urine Glucose (UA)  50    (Negative)  mg/dL


 


Urine Ketones  20    (Negative)  mg/dL


 


Urine Blood  Mod    (Negative)  


 


Urine Nitrite  Neg    (Negative)  


 


Urine Bilirubin  Neg    (Negative)  


 


Urine Urobilinogen  < 2.0    (<2.0)  mg/dL


 


Ur Leukocyte Esterase  Neg    (Negative)  


 


Urine WBC (Auto)  8.0 H    (0.0-6.0)  /HPF


 


Urine RBC (Auto)  3.0    (0.0-6.0)  /HPF


 


U Epithel Cells (Auto)  1.0    (0-13.0)  /HPF


 


Urine Bacteria (Auto)  1+    (Negative)  /HPF


 


Granular Casts  3    /LPF


 


Urine Mucus  2+    /HPF


 


Salicylates     (2.8-20.0)  mg/dL


 


Acetaminophen     (10.0-30.0)  ug/mL


 


Lithium     (0.0-1.2)  mmol/L














  06/13/18 06/13/18 06/13/18 Range/Units





  20:26 20:26 20:26 


 


WBC     (4.5-11.0)  K/mm3


 


RBC     (3.65-5.03)  M/mm3


 


Hgb     (10.1-14.3)  gm/dl


 


Hct     (30.3-42.9)  %


 


MCV     (79-97)  fl


 


MCH     (28-32)  pg


 


MCHC     (30-34)  %


 


RDW     (13.2-15.2)  %


 


Plt Count     (140-440)  K/mm3


 


Lymph % (Auto)     (13.4-35.0)  %


 


Mono % (Auto)     (0.0-7.3)  %


 


Eos % (Auto)     (0.0-4.3)  %


 


Baso % (Auto)     (0.0-1.8)  %


 


Lymph #     (1.2-5.4)  K/mm3


 


Mono #     (0.0-0.8)  K/mm3


 


Eos #     (0.0-0.4)  K/mm3


 


Baso #     (0.0-0.1)  K/mm3


 


Seg Neutrophils %     (40.0-70.0)  %


 


Seg Neutrophils #     (1.8-7.7)  K/mm3


 


ESR     (0-20)  mm/Hr


 


PT     (12.2-14.9)  Sec.


 


INR     (0.87-1.13)  


 


VBG pH    7.387  (7.320-7.420)  


 


Sodium  151 H    (137-145)  mmol/L


 


Potassium  4.2    (3.6-5.0)  mmol/L


 


Chloride  112.5 H    ()  mmol/L


 


Carbon Dioxide  21 L    (22-30)  mmol/L


 


Anion Gap  22    mmol/L


 


BUN  37 H    (7-17)  mg/dL


 


Creatinine  1.2    (0.7-1.2)  mg/dL


 


Estimated GFR  57    ml/min


 


BUN/Creatinine Ratio  31    %


 


Glucose  159 H    ()  mg/dL


 


Lactic Acid   3.10 H*   (0.7-2.0)  mmol/L


 


Calcium  9.3    (8.4-10.2)  mg/dL


 


Magnesium     (1.7-2.3)  mg/dL


 


Total Bilirubin  0.50    (0.1-1.2)  mg/dL


 


AST  113 H    (5-40)  units/L


 


ALT  43    (7-56)  units/L


 


Alkaline Phosphatase  76    ()  units/L


 


Total Creatine Kinase     ()  units/L


 


C-Reactive Protein     (0.00-1.30)  mg/dL


 


Total Protein  7.3    (6.3-8.2)  g/dL


 


Albumin  4.0    (3.9-5)  g/dL


 


Albumin/Globulin Ratio  1.2    %


 


Urine Color     (Yellow)  


 


Urine Turbidity     (Clear)  


 


Urine pH     (5.0-7.0)  


 


Ur Specific Gravity     (1.003-1.030)  


 


Urine Protein     (Negative)  mg/dL


 


Urine Glucose (UA)     (Negative)  mg/dL


 


Urine Ketones     (Negative)  mg/dL


 


Urine Blood     (Negative)  


 


Urine Nitrite     (Negative)  


 


Urine Bilirubin     (Negative)  


 


Urine Urobilinogen     (<2.0)  mg/dL


 


Ur Leukocyte Esterase     (Negative)  


 


Urine WBC (Auto)     (0.0-6.0)  /HPF


 


Urine RBC (Auto)     (0.0-6.0)  /HPF


 


U Epithel Cells (Auto)     (0-13.0)  /HPF


 


Urine Bacteria (Auto)     (Negative)  /HPF


 


Granular Casts     /LPF


 


Urine Mucus     /HPF


 


Salicylates     (2.8-20.0)  mg/dL


 


Acetaminophen     (10.0-30.0)  ug/mL


 


Lithium     (0.0-1.2)  mmol/L














  06/13/18 06/13/18 06/13/18 Range/Units





  20:53 20:53 20:53 


 


WBC     (4.5-11.0)  K/mm3


 


RBC     (3.65-5.03)  M/mm3


 


Hgb     (10.1-14.3)  gm/dl


 


Hct     (30.3-42.9)  %


 


MCV     (79-97)  fl


 


MCH     (28-32)  pg


 


MCHC     (30-34)  %


 


RDW     (13.2-15.2)  %


 


Plt Count     (140-440)  K/mm3


 


Lymph % (Auto)     (13.4-35.0)  %


 


Mono % (Auto)     (0.0-7.3)  %


 


Eos % (Auto)     (0.0-4.3)  %


 


Baso % (Auto)     (0.0-1.8)  %


 


Lymph #     (1.2-5.4)  K/mm3


 


Mono #     (0.0-0.8)  K/mm3


 


Eos #     (0.0-0.4)  K/mm3


 


Baso #     (0.0-0.1)  K/mm3


 


Seg Neutrophils %     (40.0-70.0)  %


 


Seg Neutrophils #     (1.8-7.7)  K/mm3


 


ESR  32    (0-20)  mm/Hr


 


PT     (12.2-14.9)  Sec.


 


INR     (0.87-1.13)  


 


VBG pH     (7.320-7.420)  


 


Sodium     (137-145)  mmol/L


 


Potassium     (3.6-5.0)  mmol/L


 


Chloride     ()  mmol/L


 


Carbon Dioxide     (22-30)  mmol/L


 


Anion Gap     mmol/L


 


BUN     (7-17)  mg/dL


 


Creatinine     (0.7-1.2)  mg/dL


 


Estimated GFR     ml/min


 


BUN/Creatinine Ratio     %


 


Glucose     ()  mg/dL


 


Lactic Acid     (0.7-2.0)  mmol/L


 


Calcium     (8.4-10.2)  mg/dL


 


Magnesium   2.40 H   (1.7-2.3)  mg/dL


 


Total Bilirubin     (0.1-1.2)  mg/dL


 


AST     (5-40)  units/L


 


ALT     (7-56)  units/L


 


Alkaline Phosphatase     ()  units/L


 


Total Creatine Kinase   5716 H   ()  units/L


 


C-Reactive Protein   0.90   (0.00-1.30)  mg/dL


 


Total Protein     (6.3-8.2)  g/dL


 


Albumin     (3.9-5)  g/dL


 


Albumin/Globulin Ratio     %


 


Urine Color     (Yellow)  


 


Urine Turbidity     (Clear)  


 


Urine pH     (5.0-7.0)  


 


Ur Specific Gravity     (1.003-1.030)  


 


Urine Protein     (Negative)  mg/dL


 


Urine Glucose (UA)     (Negative)  mg/dL


 


Urine Ketones     (Negative)  mg/dL


 


Urine Blood     (Negative)  


 


Urine Nitrite     (Negative)  


 


Urine Bilirubin     (Negative)  


 


Urine Urobilinogen     (<2.0)  mg/dL


 


Ur Leukocyte Esterase     (Negative)  


 


Urine WBC (Auto)     (0.0-6.0)  /HPF


 


Urine RBC (Auto)     (0.0-6.0)  /HPF


 


U Epithel Cells (Auto)     (0-13.0)  /HPF


 


Urine Bacteria (Auto)     (Negative)  /HPF


 


Granular Casts     /LPF


 


Urine Mucus     /HPF


 


Salicylates    < 0.3 L  (2.8-20.0)  mg/dL


 


Acetaminophen     (10.0-30.0)  ug/mL


 


Lithium    0.1  (0.0-1.2)  mmol/L














  06/13/18 06/13/18 Range/Units





  20:53 21:17 


 


WBC    (4.5-11.0)  K/mm3


 


RBC    (3.65-5.03)  M/mm3


 


Hgb    (10.1-14.3)  gm/dl


 


Hct    (30.3-42.9)  %


 


MCV    (79-97)  fl


 


MCH    (28-32)  pg


 


MCHC    (30-34)  %


 


RDW    (13.2-15.2)  %


 


Plt Count    (140-440)  K/mm3


 


Lymph % (Auto)    (13.4-35.0)  %


 


Mono % (Auto)    (0.0-7.3)  %


 


Eos % (Auto)    (0.0-4.3)  %


 


Baso % (Auto)    (0.0-1.8)  %


 


Lymph #    (1.2-5.4)  K/mm3


 


Mono #    (0.0-0.8)  K/mm3


 


Eos #    (0.0-0.4)  K/mm3


 


Baso #    (0.0-0.1)  K/mm3


 


Seg Neutrophils %    (40.0-70.0)  %


 


Seg Neutrophils #    (1.8-7.7)  K/mm3


 


ESR    (0-20)  mm/Hr


 


PT    (12.2-14.9)  Sec.


 


INR    (0.87-1.13)  


 


VBG pH    (7.320-7.420)  


 


Sodium    (137-145)  mmol/L


 


Potassium    (3.6-5.0)  mmol/L


 


Chloride    ()  mmol/L


 


Carbon Dioxide    (22-30)  mmol/L


 


Anion Gap    mmol/L


 


BUN    (7-17)  mg/dL


 


Creatinine    (0.7-1.2)  mg/dL


 


Estimated GFR    ml/min


 


BUN/Creatinine Ratio    %


 


Glucose    ()  mg/dL


 


Lactic Acid   1.80  (0.7-2.0)  mmol/L


 


Calcium    (8.4-10.2)  mg/dL


 


Magnesium    (1.7-2.3)  mg/dL


 


Total Bilirubin    (0.1-1.2)  mg/dL


 


AST    (5-40)  units/L


 


ALT    (7-56)  units/L


 


Alkaline Phosphatase    ()  units/L


 


Total Creatine Kinase    ()  units/L


 


C-Reactive Protein    (0.00-1.30)  mg/dL


 


Total Protein    (6.3-8.2)  g/dL


 


Albumin    (3.9-5)  g/dL


 


Albumin/Globulin Ratio    %


 


Urine Color    (Yellow)  


 


Urine Turbidity    (Clear)  


 


Urine pH    (5.0-7.0)  


 


Ur Specific Gravity    (1.003-1.030)  


 


Urine Protein    (Negative)  mg/dL


 


Urine Glucose (UA)    (Negative)  mg/dL


 


Urine Ketones    (Negative)  mg/dL


 


Urine Blood    (Negative)  


 


Urine Nitrite    (Negative)  


 


Urine Bilirubin    (Negative)  


 


Urine Urobilinogen    (<2.0)  mg/dL


 


Ur Leukocyte Esterase    (Negative)  


 


Urine WBC (Auto)    (0.0-6.0)  /HPF


 


Urine RBC (Auto)    (0.0-6.0)  /HPF


 


U Epithel Cells (Auto)    (0-13.0)  /HPF


 


Urine Bacteria (Auto)    (Negative)  /HPF


 


Granular Casts    /LPF


 


Urine Mucus    /HPF


 


Salicylates    (2.8-20.0)  mg/dL


 


Acetaminophen  < 5.0 L   (10.0-30.0)  ug/mL


 


Lithium    (0.0-1.2)  mmol/L














- EKG Data


-: EKG Interpreted by Me


EKG shows normal: sinus rhythm


Rate: tachycardia





- EKG Data


When compared to previous EKG there are: previous EKG unavailable





06/13/18 22:21


Sinus tachycardia, normal axis, motion artifact, QTC prolonged, abnormal EKG, 

not a STEMI





- Radiology Data


Radiology results: report reviewed, image reviewed





X-ray of the chest is negative for acute disease


Critical Care Time: Yes


Critical care time in (mins) excluding proc time.: 60


Critical care attestation.: 


If time is entered above; I have spent that time in minutes in the direct care 

of this critically ill patient, excluding procedure time.





Critical Care Time: 





Critical care time includes multiple bedside evaluations, interpretation of 

laboratory studies, radiology studies, multiple and frequent bedside 

reassessments, and time spent discussing care with multiple consulting services

, including the [facility], and the hospital medicine 

service.  This does not include procedure time.





ED Disposition


Clinical Impression: 


 SIRS (systemic inflammatory response syndrome), Encephalopathy





Rhabdomyolysis


Qualifiers:


 Rhabdomyolysis type: non-traumatic Qualified Code(s): M62.82 - Rhabdomyolysis





Disposition: DC-09 OP ADMIT IP TO THIS HOSP


Is pt being admited?: Yes


Condition: Fair

## 2018-06-14 LAB
BASOPHILS # (AUTO): 0.1 K/MM3 (ref 0–0.1)
BASOPHILS CSF MANUAL: 0 %
BASOPHILS NFR BLD AUTO: 0.4 % (ref 0–1.8)
BUN SERPL-MCNC: 25 MG/DL (ref 7–17)
BUN/CREAT SERPL: 36 %
CALCIUM SERPL-MCNC: 8.1 MG/DL (ref 8.4–10.2)
EOSINOPHIL # BLD AUTO: 0 K/MM3 (ref 0–0.4)
EOSINOPHIL NFR BLD AUTO: 0.1 % (ref 0–4.3)
GLUCOSE CSF-MCNC: 94 MG/DL
HCT VFR BLD CALC: 35.3 % (ref 30.3–42.9)
HEMOLYSIS INDEX: 127
HGB BLD-MCNC: 11.4 GM/DL (ref 10.1–14.3)
LYMPHOCYTES # BLD AUTO: 3.1 K/MM3 (ref 1.2–5.4)
LYMPHOCYTES NFR BLD AUTO: 15.9 % (ref 13.4–35)
MCH RBC QN AUTO: 31 PG (ref 28–32)
MCHC RBC AUTO-ENTMCNC: 33 % (ref 30–34)
MCV RBC AUTO: 94 FL (ref 79–97)
MONOCYTES # (AUTO): 1.8 K/MM3 (ref 0–0.8)
MONOCYTES % (AUTO): 9.4 % (ref 0–7.3)
PLATELET # BLD: 339 K/MM3 (ref 140–440)
RBC # BLD AUTO: 3.77 M/MM3 (ref 3.65–5.03)
RED BLOOD CELL,CSF: 54 /MM3 (ref 0–0)
TOTAL CELLS COUNTED: 7 /MM3
WBC # CSF: 2 /MM3 (ref 1–10)

## 2018-06-14 RX ADMIN — DEXTROSE AND SODIUM CHLORIDE SCH MLS/HR: 5; .45 INJECTION, SOLUTION INTRAVENOUS at 02:41

## 2018-06-14 RX ADMIN — DEXTROSE SCH MLS/HR: 5 SOLUTION INTRAVENOUS at 10:32

## 2018-06-14 RX ADMIN — DEXTROSE AND SODIUM CHLORIDE SCH MLS/HR: 5; .45 INJECTION, SOLUTION INTRAVENOUS at 09:18

## 2018-06-14 RX ADMIN — Medication SCH ML: at 23:22

## 2018-06-14 RX ADMIN — LORAZEPAM SCH: 2 INJECTION INTRAMUSCULAR; INTRAVENOUS at 19:50

## 2018-06-14 RX ADMIN — PIPERACILLIN AND TAZOBACTAM SCH MLS/HR: 4; .5 INJECTION, POWDER, FOR SOLUTION INTRAVENOUS at 13:53

## 2018-06-14 RX ADMIN — DEXTROSE SCH MLS/HR: 5 SOLUTION INTRAVENOUS at 19:12

## 2018-06-14 RX ADMIN — Medication SCH ML: at 09:19

## 2018-06-14 RX ADMIN — ENOXAPARIN SODIUM SCH MG: 100 INJECTION SUBCUTANEOUS at 09:15

## 2018-06-14 RX ADMIN — PIPERACILLIN AND TAZOBACTAM SCH MLS/HR: 4; .5 INJECTION, POWDER, FOR SOLUTION INTRAVENOUS at 23:22

## 2018-06-14 NOTE — PROGRESS NOTE
Assessment and Plan


Assessment and plan: 


Per HPI documentation


53 year old woman with bipolar, schizophrenia was brought to the ER for 

evaluation of altered mental status. No further history is available. A lumbar 

puncture was done and the patient was treat emperically for meningitis and 

possible NMS, however poison control recommend supportive care. A ROS is 

unobtainable


EMS reported that patient is from a group home, unknown baseline mental status








Acute Encephalopathy Unkown Etiology


* CT brain negative.


* R/O Serotonin syndrome-Less likely


* Neurology and Pysch consultation.


* EEG


* lumber puncture serology negative for Bacterial Meningitis


* ?Acute cystitis


 * Await cultures


 * Increased leukocytosis today could be secondary to steroids but will monitor


SIRS


* POA, Leukocytosis, Tachycardia,


* Continue vancomycin and zosyn D#2


* cxr is negative for acute disease


* s/p one dose Acyclovir. 


Hypernatermia


* change to D5W and recheck


Mildly elevated CPK.-Possible Mild Rhabdomylysis


* EKG on admission-Sinus tachycardia, normal axis, motion artifact, QTC 

prolonged, abnormal EKG, not a STEMI per ER. Not available for my review


Bipolar disorder


Group Home Resident


Schizophrenia


* On Respiradone and ativan on MED REC


* Resume PRN ativan to prevent withdrawal





The high probability of a clinically significant, sudden or life threatening 

deterioration of the [NEURO] system(s) required my full and direct attention, 

intervention and personal management. The aggregate critical care time was [35] 

minutes. This time is in addition to time spent performing reported procedures 

but includes the following: 





[X] Data Review and interpretation 





[X] Patient assessment and monitoring of vital signs 





[X] Documentation 





[X] Medication orders and management





History


Interval history: 


Patient seen and examined still confused this morning, not following commands. 





Hospitalist Physical





- Physical exam


Narrative exam: 


Gen. appearance: Patient lying in bed, no apparent distress


HEENT: Normocephalic, atraumatic, pupils equally round and reactive to light, 

pinpoint, unable to do extraocular movement, and no sclericterus,. No JVD or 

thyromegaly or nodule,neck supple, no carotid bruit ,mucous membranes moist, no 

exudate or erythema


Heart: S1, S2, regular rate and rhythm


Lungs: Clear anteriorly bilaterally, breathing comfortable


Abdomen: Positive bowel sounds, soft, nondistended, no organomegaly


Extremity:no edema,  cyanosis, clubbing


Neuro: sedated














- Constitutional


Vitals: 


 











Temp Pulse Resp BP Pulse Ox


 


 98.5 F   106 H  12   120/78   99 


 


 06/14/18 08:00  06/14/18 08:00  06/14/18 08:00  06/14/18 08:00  06/14/18 08:00














Results





- Labs


CBC & Chem 7: 


 06/14/18 04:25





 06/14/18 04:25


Labs: 


 Laboratory Last Values











WBC  19.6 K/mm3 (4.5-11.0)  H  06/14/18  04:25    


 


RBC  3.77 M/mm3 (3.65-5.03)   06/14/18  04:25    


 


Hgb  11.4 gm/dl (10.1-14.3)   06/14/18  04:25    


 


Hct  35.3 % (30.3-42.9)   06/14/18  04:25    


 


MCV  94 fl (79-97)   06/14/18  04:25    


 


MCH  31 pg (28-32)   06/14/18  04:25    


 


MCHC  33 % (30-34)   06/14/18  04:25    


 


RDW  12.5 % (13.2-15.2)  L  06/14/18  04:25    


 


Plt Count  339 K/mm3 (140-440)   06/14/18  04:25    


 


Lymph % (Auto)  15.9 % (13.4-35.0)   06/14/18  04:25    


 


Mono % (Auto)  9.4 % (0.0-7.3)  H  06/14/18  04:25    


 


Eos % (Auto)  0.1 % (0.0-4.3)   06/14/18  04:25    


 


Baso % (Auto)  0.4 % (0.0-1.8)   06/14/18  04:25    


 


Lymph #  3.1 K/mm3 (1.2-5.4)   06/14/18  04:25    


 


Mono #  1.8 K/mm3 (0.0-0.8)  H  06/14/18  04:25    


 


Eos #  0.0 K/mm3 (0.0-0.4)   06/14/18  04:25    


 


Baso #  0.1 K/mm3 (0.0-0.1)   06/14/18  04:25    


 


Seg Neutrophils %  74.2 % (40.0-70.0)  H  06/14/18  04:25    


 


Seg Neutrophils #  14.5 K/mm3 (1.8-7.7)  H  06/14/18  04:25    


 


ESR  32 mm/Hr (0-20)   06/13/18  20:53    


 


PT  15.4 Sec. (12.2-14.9)  H  06/13/18  20:26    


 


INR  1.16  (0.87-1.13)  H  06/13/18  20:26    


 


POC ABG pH  7.460  (7.35-7.45)  H  06/13/18  21:10    


 


POC ABG pCO2  32.3  (35-45)  L  06/13/18  21:10    


 


POC ABG pO2  92  ()   06/13/18  21:10    


 


POC ABG HCO3  23.0   06/13/18  21:10    


 


POC ABG Total CO2  24   06/13/18  21:10    


 


POC ABG O2 Sat  98   06/13/18  21:10    


 


POC ABG Base Excess  -1   06/13/18  21:10    


 


VBG pH  7.387  (7.320-7.420)   06/13/18  20:26    


 


FiO2  21 %  06/13/18  21:10    


 


Sodium  154 mmol/L (137-145)  H  06/14/18  04:25    


 


Potassium  4.2 mmol/L (3.6-5.0)   06/14/18  04:25    


 


Chloride  118.0 mmol/L ()  H  06/14/18  04:25    


 


Carbon Dioxide  21 mmol/L (22-30)  L  06/14/18  04:25    


 


Anion Gap  19 mmol/L  06/14/18  04:25    


 


BUN  25 mg/dL (7-17)  H  06/14/18  04:25    


 


Creatinine  0.7 mg/dL (0.7-1.2)   06/14/18  04:25    


 


Estimated GFR  > 60 ml/min  06/14/18  04:25    


 


BUN/Creatinine Ratio  36 %  06/14/18  04:25    


 


Glucose  117 mg/dL ()  H  06/14/18  04:25    


 


Lactic Acid  1.30 mmol/L (0.7-2.0)   06/13/18  23:41    


 


Calcium  8.1 mg/dL (8.4-10.2)  L  06/14/18  04:25    


 


Magnesium  2.40 mg/dL (1.7-2.3)  H  06/13/18  20:53    


 


Total Bilirubin  0.50 mg/dL (0.1-1.2)   06/13/18  20:26    


 


AST  113 units/L (5-40)  H  06/13/18  20:26    


 


ALT  43 units/L (7-56)   06/13/18  20:26    


 


Alkaline Phosphatase  76 units/L ()   06/13/18  20:26    


 


Total Creatine Kinase  5392 units/L ()  H  06/14/18  01:39    


 


CK-MB (CK-2)  40.1 ng/mL (0.0-4.0)  H  06/14/18  01:39    


 


CK-MB (CK-2) Rel Index  0.7  (0-4)   06/14/18  01:39    


 


Troponin T  < 0.010 ng/mL (0.00-0.029)   06/14/18  01:39    


 


C-Reactive Protein  0.90 mg/dL (0.00-1.30)   06/13/18  20:53    


 


Total Protein  7.3 g/dL (6.3-8.2)   06/13/18  20:26    


 


Albumin  4.0 g/dL (3.9-5)   06/13/18  20:26    


 


Albumin/Globulin Ratio  1.2 %  06/13/18  20:26    


 


Urine Color  Yellow  (Yellow)   06/13/18  20:05    


 


Urine Turbidity  Clear  (Clear)   06/13/18  20:05    


 


Urine pH  5.0  (5.0-7.0)   06/13/18  20:05    


 


Ur Specific Gravity  1.028  (1.003-1.030)   06/13/18  20:05    


 


Urine Protein  >500 mg/dL (Negative)   06/13/18  20:05    


 


Urine Glucose (UA)  50 mg/dL (Negative)   06/13/18  20:05    


 


Urine Ketones  20 mg/dL (Negative)   06/13/18  20:05    


 


Urine Blood  Mod  (Negative)   06/13/18  20:05    


 


Urine Nitrite  Neg  (Negative)   06/13/18  20:05    


 


Urine Bilirubin  Neg  (Negative)   06/13/18  20:05    


 


Urine Urobilinogen  < 2.0 mg/dL (<2.0)   06/13/18  20:05    


 


Ur Leukocyte Esterase  Neg  (Negative)   06/13/18  20:05    


 


Urine WBC (Auto)  8.0 /HPF (0.0-6.0)  H  06/13/18  20:05    


 


Urine RBC (Auto)  3.0 /HPF (0.0-6.0)   06/13/18  20:05    


 


U Epithel Cells (Auto)  1.0 /HPF (0-13.0)   06/13/18  20:05    


 


Urine Bacteria (Auto)  1+ /HPF (Negative)   06/13/18  20:05    


 


Granular Casts  3 /LPF  06/13/18  20:05    


 


Urine Mucus  2+ /HPF  06/13/18  20:05    


 


CSF Appearance  Clear   06/13/18  23:00    


 


CSF Color  Colorless   06/13/18  23:00    


 


CSF WBC  2 /mm3 (1-10)   06/13/18  23:00    


 


CSF RBC  54 /mm3 (0-0)   06/13/18  23:00    


 


CSF Seg Neutrophils  14.3 % (0-6)   06/13/18  23:00    


 


CSF Lymphocytes %  85.7 % (40-80)   06/13/18  23:00    


 


CSF Reactive Lymphs  0 %  06/13/18  23:00    


 


CSF Monocytes %  0 % (15-45)   06/13/18  23:00    


 


CSF Eosinophils %  0 %  06/13/18  23:00    


 


CSF Basophils  0 %  06/13/18  23:00    


 


CSF Comment  Diff performed   06/13/18  23:00    


 


CSF Pathologist Review  C   06/13/18  23:00    


 


CSF Glucose  94 mg/dL  06/13/18  23:00    


 


CSF Total Protein  56 mg/dL  06/13/18  23:00    


 


Salicylates  < 0.3 mg/dL (2.8-20.0)  L  06/13/18  20:53    


 


Acetaminophen  < 5.0 ug/mL (10.0-30.0)  L  06/13/18  20:53    


 


Lithium  0.1 mmol/L (0.0-1.2)   06/13/18  20:53

## 2018-06-14 NOTE — CONSULTATION
History of Present Illness


Consult date: 06/14/18


Requesting physician: RAMIRO SIMON


History of present illness: 





PULMONARY/CCM CONSULT NOTE (Full dictation # [id number])





Please see dictated notes for full details








Medications and Allergies


 Allergies











Allergy/AdvReac Type Severity Reaction Status Date / Time


 


Unable to Assess Allergy   Unverified 06/13/18 20:11











 Home Medications











 Medication  Instructions  Recorded  Confirmed  Last Taken  Type


 


LORazepam [Ativan] 1 mg PO TID 06/13/18 06/13/18 Unknown History


 


risperiDONE [Risperdal] 2 mg PO BID 06/13/18 06/13/18 Unknown History











Active Meds: 


Active Medications





Acetaminophen (Tylenol)  650 mg PO Q4H PRN


   PRN Reason: Pain MILD(1-3)/Fever >100.5/HA


Enoxaparin Sodium (Lovenox)  40 mg SUB-Q QDAY@1000 JON


Dextrose/Sodium Chloride (D5/0.45ns)  1,000 mls @ 150 mls/hr IV AS DIRECT JON


   Last Admin: 06/14/18 02:41 Dose:  150 mls/hr


Piperacillin Sod/Tazobactam Sod (Zosyn/Ns 3.375gm/50ml)  3.375 gm in 50 mls @ 

100 mls/hr IV Q8HR JON; Protocol


   Last Infusion: 06/14/18 06:50 Dose:  Infused


Ondansetron HCl (Zofran)  4 mg IV Q4H PRN


   PRN Reason: Nausea And Vomiting


Sodium Chloride (Sodium Chloride Flush Syringe 10 Ml)  10 ml IV BID JON


Sodium Chloride (Sodium Chloride Flush Syringe 10 Ml)  10 ml IV PRN PRN


   PRN Reason: LINE FLUSH











Physical Examination


Vital signs: 


 Vital Signs











Pulse Resp


 


 137 H  19 


 


 06/13/18 19:58  06/13/18 19:58














Results





- Laboratory Findings


CBC and BMP: 


 06/14/18 04:25





 06/14/18 04:25


ABG











POC ABG pH  7.460  (7.35-7.45)  H  06/13/18  21:10    


 


POC ABG pCO2  32.3  (35-45)  L  06/13/18  21:10    


 


POC ABG pO2  92  ()   06/13/18  21:10    


 


POC ABG HCO3  23.0   06/13/18  21:10    


 


POC ABG Total CO2  24   06/13/18  21:10    


 


POC ABG O2 Sat  98   06/13/18  21:10    





PT/INR, D-dimer











PT  15.4 Sec. (12.2-14.9)  H  06/13/18  20:26    


 


INR  1.16  (0.87-1.13)  H  06/13/18  20:26    








Abnormal lab findings: 


 Abnormal Labs











  06/13/18 06/13/18 06/13/18





  20:05 20:26 20:26


 


WBC   17.1 H 


 


RDW   12.9 L 


 


Plt Count   475 H 


 


Lymph % (Auto)   7.7 L 


 


Mono % (Auto)   7.6 H 


 


Mono #   1.3 H 


 


Seg Neutrophils %   84.4 H 


 


Seg Neutrophils #   14.5 H 


 


PT    15.4 H


 


INR    1.16 H


 


POC ABG pH   


 


POC ABG pCO2   


 


Sodium   


 


Chloride   


 


Carbon Dioxide   


 


BUN   


 


Glucose   


 


Lactic Acid   


 


Calcium   


 


Magnesium   


 


AST   


 


Total Creatine Kinase   


 


CK-MB (CK-2)   


 


Urine WBC (Auto)  8.0 H  


 


Salicylates   


 


Acetaminophen   














  06/13/18 06/13/18 06/13/18





  20:26 20:26 20:53


 


WBC   


 


RDW   


 


Plt Count   


 


Lymph % (Auto)   


 


Mono % (Auto)   


 


Mono #   


 


Seg Neutrophils %   


 


Seg Neutrophils #   


 


PT   


 


INR   


 


POC ABG pH   


 


POC ABG pCO2   


 


Sodium  151 H  


 


Chloride  112.5 H  


 


Carbon Dioxide  21 L  


 


BUN  37 H  


 


Glucose  159 H  


 


Lactic Acid   3.10 H* 


 


Calcium   


 


Magnesium    2.40 H


 


AST  113 H  


 


Total Creatine Kinase    5716 H


 


CK-MB (CK-2)   


 


Urine WBC (Auto)   


 


Salicylates   


 


Acetaminophen   














  06/13/18 06/13/18 06/13/18





  20:53 20:53 21:10


 


WBC   


 


RDW   


 


Plt Count   


 


Lymph % (Auto)   


 


Mono % (Auto)   


 


Mono #   


 


Seg Neutrophils %   


 


Seg Neutrophils #   


 


PT   


 


INR   


 


POC ABG pH    7.460 H


 


POC ABG pCO2    32.3 L


 


Sodium   


 


Chloride   


 


Carbon Dioxide   


 


BUN   


 


Glucose   


 


Lactic Acid   


 


Calcium   


 


Magnesium   


 


AST   


 


Total Creatine Kinase   


 


CK-MB (CK-2)   


 


Urine WBC (Auto)   


 


Salicylates  < 0.3 L  


 


Acetaminophen   < 5.0 L 














  06/14/18 06/14/18 06/14/18





  01:39 04:25 04:25


 


WBC   19.6 H 


 


RDW   12.5 L 


 


Plt Count   


 


Lymph % (Auto)   


 


Mono % (Auto)   9.4 H 


 


[place] #   1.8 H 


 


Seg Neutrophils %   74.2 H 


 


Seg Neutrophils #   14.5 H 


 


PT   


 


INR   


 


POC ABG pH   


 


POC ABG pCO2   


 


Sodium    154 H


 


Chloride    118.0 H


 


Carbon Dioxide    21 L


 


BUN    25 H


 


Glucose    117 H


 


Lactic Acid   


 


Calcium    8.1 L


 


Magnesium   


 


AST   


 


Total Creatine Kinase  5392 H  


 


CK-MB (CK-2)  40.1 H  


 


Urine WBC (Auto)   


 


Salicylates   


 


Acetaminophen

## 2018-06-14 NOTE — CONSULTATION
History of Present Illness


Consult date: 06/14/18


Requesting physician: LAUREN RIVERS


Reason for Consult: AMS


Chief complaint: 


AMS





History of present illness: 


This 53 year old  female cannot give a history.  Per Dr. Vasquez: 

patient "with bipolar, schizophrenia was brought to the ER for evaluation of 

altered mental status. No further history is available. A lumbar puncture was 

done and the patient was treat emperically for meningitis and possible NMS, 

however poison control recommend supportive care. A ROS is unobtainable. EMS 

reported that patient is from a group home, unknown baseline mental status."  

LP was negative.





Past History


Past Medical History: other (schizophrenia, bipolar disorder)


Social history: smoking (but can't tell me how much), other (cannot answer 

questions)


Family history: other (cannot answer questions)





Medications and Allergies


 Allergies











Allergy/AdvReac Type Severity Reaction Status Date / Time


 


Unable to Assess Allergy   Unverified 06/13/18 20:11











 Home Medications











 Medication  Instructions  Recorded  Confirmed  Last Taken  Type


 


LORazepam [Ativan] 1 mg PO TID 06/13/18 06/13/18 Unknown History


 


risperiDONE [Risperdal] 2 mg PO BID 06/13/18 06/13/18 Unknown History











Active Meds: 


Active Medications





Acetaminophen (Tylenol)  650 mg PO Q4H PRN


   PRN Reason: Pain MILD(1-3)/Fever >100.5/HA


Enoxaparin Sodium (Lovenox)  40 mg SUB-Q QDAY@1000 JON


   Last Admin: 06/14/18 09:15 Dose:  40 mg


Piperacillin Sod/Tazobactam Sod (Zosyn/Ns 4.5gm/100ml)  4.5 gm in 100 mls @ 200 

mls/hr IV Q8HR JON


Dextrose (D5w)  1,000 mls @ 125 mls/hr IV AS DIRECT [facility]


   Last Admin: 06/14/18 10:32 Dose:  125 mls/hr


Lorazepam (Ativan)  0.5 mg PO TID JON


Ondansetron HCl (Zofran)  4 mg IV Q4H PRN


   PRN Reason: Nausea And Vomiting


Sodium Chloride (Sodium Chloride Flush Syringe 10 Ml)  10 ml IV BID [facility]


   Last Admin: 06/14/18 09:19 Dose:  10 ml


Sodium Chloride (Sodium Chloride Flush Syringe 10 Ml)  10 ml IV PRN PRN


   PRN Reason: LINE FLUSH











Review of Systems


ROS unobtainable: due to mental status (says yes to headache but cannot describe

)





Physical Examination





- Vital Signs


Vital Signs: 


 Vital Signs











Pulse Resp


 


 137 H  19 


 


 06/13/18 19:58  06/13/18 19:58














- Physical Exam


Narrative exam: 


General Appearance: well developed well nourished (per BMI) early 50's  

American female in NAD but incoherent with one or two words understandable but 

mostly garbled.





HEENT: atraumatic, normocephalic; no bruits, 2+ [place] without soreness, sclerae 

nonicteric.  Oropharynx pink and moist.  


Neck: stiff, no bruits.  


Heart: no murmur or extra sounds.  Rapid rate.


Extremities: no clubbing, cyanosis or edema.  2+ dorsalis pedis pulses 

bilaterally.





Neurologic Exam:


Mental Status: Awake, cannot answer orientation questions even by multichoice.  

Obeys some commands as noted.


Cranial Nerves: blinks to threat, cannot cooperate for fundus exam, PERRL, EOMs 

full to Doll's eyes without nystagmus but does not track me, facial sensation 

intact to pinprick, positive corneals, no facial grimace to command or 

supraorbital pressure, Bowen cannot be tested, palate rises symmetrically to 

phonation and gags are positive, won't shrug shoulders or protrude tongue. 


Cerebellar: finger to nose and heel to shin cannot be done, some resting tremor 

X 2.  


Sensory: seems to have decreased pinprick below knees, cannot test light touch 

or vibrations.  Double simultaneous stimulation cannot be done.  


Motor Exam Upper Extremities: won't lift arms but keeps right arm vertically in 

air with elbow one foot off bed once placed there and holds left arm above bed 

slightly once placed there.  Tone very increased with finger contractures jacinto 

left. No atrophy or fasciculations are noted visually. 


Motor Exam Lower Extremities: will not lift legs off bed.  Tone is increased on 

the right.  No atrophy or fasciculations are noted visually.  Withdraws legs to 

plantar rub X 2.  Wiggles toes to command right>left but won't kick feet off 

bed with knee supported.


Reflexes: Palmomental and jaw jerk are negative but snout is strongly positive.

  Triceps are 1+, biceps are 2 and brachioradialis are 2 bilaterally.  [place]'

s is negative bilaterally.  Knee jerks are 3 and ankle jerks are trace right 

and 0 left without clonus.  Toes are upgoing right and downgoing left to 

Babinski testing.














- Assessment


Assessment Interval: Baseline (not done since not appropriate)





Results





- Laboratory Findings


CBC and BMP: 


 06/14/18 04:25





 06/14/18 04:25


Abnormal Lab Findings: 


 Abnormal Labs











  06/13/18 06/13/18 06/13/18





  20:05 20:26 20:26


 


WBC   17.1 H 


 


RDW   12.9 L 


 


Plt Count   475 H 


 


Lymph % (Auto)   7.7 L 


 


Mono % (Auto)   7.6 H 


 


Mono #   1.3 H 


 


Seg Neutrophils %   84.4 H 


 


Seg Neutrophils #   14.5 H 


 


PT    15.4 H


 


INR    1.16 H


 


POC ABG pH   


 


POC ABG pCO2   


 


Sodium   


 


Chloride   


 


Carbon Dioxide   


 


BUN   


 


Glucose   


 


Lactic Acid   


 


Calcium   


 


Magnesium   


 


AST   


 


Total Creatine Kinase   


 


CK-MB (CK-2)   


 


Urine WBC (Auto)  8.0 H  


 


Salicylates   


 


Acetaminophen   














  06/13/18 06/13/18 06/13/18





  20:26 20:26 20:53


 


WBC   


 


RDW   


 


Plt Count   


 


Lymph % (Auto)   


 


Mono % (Auto)   


 


Mono #   


 


Seg Neutrophils %   


 


Seg Neutrophils #   


 


PT   


 


INR   


 


POC ABG pH   


 


POC ABG pCO2   


 


Sodium  151 H  


 


Chloride  112.5 H  


 


Carbon Dioxide  21 L  


 


BUN  37 H  


 


Glucose  159 H  


 


Lactic Acid   3.10 H* 


 


Calcium   


 


Magnesium    2.40 H


 


AST  113 H  


 


Total Creatine Kinase    5716 H


 


CK-MB (CK-2)   


 


Urine WBC (Auto)   


 


Salicylates   


 


Acetaminophen   














  06/13/18 06/13/18 06/13/18





  20:53 20:53 21:10


 


WBC   


 


RDW   


 


Plt Count   


 


Lymph % (Auto)   


 


Mono % (Auto)   


 


Mono #   


 


Seg Neutrophils %   


 


Seg Neutrophils #   


 


PT   


 


INR   


 


POC ABG pH    7.460 H


 


POC ABG pCO2    32.3 L


 


Sodium   


 


Chloride   


 


Carbon Dioxide   


 


BUN   


 


Glucose   


 


Lactic Acid   


 


Calcium   


 


Magnesium   


 


AST   


 


Total Creatine Kinase   


 


CK-MB (CK-2)   


 


Urine WBC (Auto)   


 


Salicylates  < 0.3 L  


 


Acetaminophen   < 5.0 L 














  06/14/18 06/14/18 06/14/18





  01:39 04:25 04:25


 


WBC   19.6 H 


 


RDW   12.5 L 


 


Plt Count   


 


Lymph % (Auto)   


 


Mono % (Auto)   9.4 H 


 


[place] #   1.8 H 


 


Seg Neutrophils %   74.2 H 


 


Seg Neutrophils #   14.5 H 


 


PT   


 


INR   


 


POC ABG pH   


 


POC ABG pCO2   


 


Sodium    154 H


 


Chloride    118.0 H


 


Carbon Dioxide    21 L


 


BUN    25 H


 


Glucose    117 H


 


Lactic Acid   


 


Calcium    8.1 L


 


Magnesium   


 


AST   


 


Total Creatine Kinase  5392 H  


 


CK-MB (CK-2)  40.1 H  


 


Urine WBC (Auto)   


 


Salicylates   


 


Acetaminophen   














Assessment and Plan


Impression:


1.  Confusion


2.  Hypernatremia





Plan:


1.  MRI brain and MRA and MR venogram all without contrast, to be done due to 

motor and reflex asymmetries and spasticity.  If negative may need cervical 

spine MRI.


2.  EEG done, normal waking.


3.  Ativan around the clock instead of prn, to avoid withdrawal.








35 min critical care time spent. 





Thank you for an interesting consultation on this unfortunate early 50's lady.

## 2018-06-14 NOTE — ELECTROENCEPHALOGRAM REPORT
Electroencephalogram EEG


Date of exam: 06/14/18


Description: 


Preliminary reading: EMG artifact, shivering artifact, otherwise normal waking.








Interpretation: 


Preliminary reading: normal waking EEG.

## 2018-06-15 LAB
BUN SERPL-MCNC: 5 MG/DL (ref 7–17)
BUN/CREAT SERPL: 8 %
CALCIUM SERPL-MCNC: 8.6 MG/DL (ref 8.4–10.2)
HEMOLYSIS INDEX: 50

## 2018-06-15 RX ADMIN — DEXTROSE SCH MLS/HR: 5 SOLUTION INTRAVENOUS at 03:00

## 2018-06-15 RX ADMIN — ENOXAPARIN SODIUM SCH MG: 100 INJECTION SUBCUTANEOUS at 10:22

## 2018-06-15 RX ADMIN — Medication SCH ML: at 10:22

## 2018-06-15 RX ADMIN — PIPERACILLIN AND TAZOBACTAM SCH MLS/HR: 4; .5 INJECTION, POWDER, FOR SOLUTION INTRAVENOUS at 06:25

## 2018-06-15 RX ADMIN — DEXTROSE SCH MLS/HR: 5 SOLUTION INTRAVENOUS at 22:07

## 2018-06-15 RX ADMIN — LORAZEPAM SCH: 2 INJECTION INTRAMUSCULAR; INTRAVENOUS at 10:11

## 2018-06-15 RX ADMIN — DEXTROSE SCH MLS/HR: 5 SOLUTION INTRAVENOUS at 12:35

## 2018-06-15 RX ADMIN — Medication SCH ML: at 22:06

## 2018-06-15 RX ADMIN — BENZTROPINE MESYLATE SCH MG: 1 INJECTION, SOLUTION INTRAMUSCULAR; INTRAVENOUS at 15:25

## 2018-06-15 NOTE — XRAY REPORT
AP ABDOMEN:



HISTORY: Feeding tube placement.



The feeding tube terminates in the distal stomach. The abdominal gas 

pattern is unremarkable.  No masses or

organomegaly is identified and there is no gross evidence of free air 

or fluid.  No significant soft tissue calcifications are noted.



IMPRESSION:

Unremarkable abdomen.

## 2018-06-15 NOTE — CONSULTATION
CONSULTING PHYSICIAN: Kendrick Aguirre MD



REASON FOR CONSULTATION:  Altered mental status, suspicion for neuroleptic

malignant syndrome.



CHIEF COMPLAINT AND HISTORY OF PRESENT ILLNESS:  The patient is a 53-year-old

-American female, as far as I can tell from the records, past medical

history is significant for bipolar disorder and schizophrenia, was brought in to

the Emergency Room for evaluation of altered mental status.  No further history

was available.  According to the emergency physician's notes, the patient was

awake, did not speak, was not following command, was moaning occasionally.  The

evaluation in the Emergency Room revealed amongst other things that she was

awake, was not following commands, but again there is no other history that we

could get, no family and no records from her reported group home.  Evaluation

revealed that she had been on Risperdal.  She had been on some lorazepam.  There

was a fear for possible neuroleptic malignant syndrome, I believe her labs

showed evidence of rhabdomyolysis and we are asked to assist with her

management.  When I stopped by to see her in the Emergency Room, she was as

described in the ER, still showed signs of the systemic inflammatory response

syndrome.  There had been no reported seizures.  There has been no high-grade

fevers.  The patient's tobacco use/abuse history is unknown.  She reportedly had

gotten a lumbar puncture done that really was an unremarkable lumbar puncture or

CSF study.  This really is as much of the history of presentation as I have.



PAST MEDICAL HISTORY:  Reported history of bipolar disorder and schizophrenia.



PAST SURGICAL HISTORY:  Unknown.



MEDICATIONS:  She was on at the time I stopped by to see were reviewed,

pertinent medications include the following:  P.r.n. Tylenol, Ativan 0.5 mg p.o.

t.i.d. scheduled, Zofran 4 mg IV q. 4 hours p.r.n. nausea and vomiting, Zosyn

4.5 grams IV q. 8 hours.  She received acyclovir 800 mg IV in the ER, she

received Rocephin in the ER, she received a 10 mg dose of Decadron x 1.  She

received a dose of vancomycin 1.25 grams.



ALLERGIES:  Unknown.



DIET:  Petite lady, acute weight loss or gain history is unknown.



FAMILY AND SOCIAL HISTORY:  Reportedly lived in a group home prior to this

admission.  Alcohol, tobacco, or illicit drug use or abuse history is unknown. 

The records from the ER mention that she is a never smoker.



REVIEW OF SYSTEMS:  Unobtainable secondary to the patient's medical and mental

condition.  Again, since she has been here, no active seizure activity.  No

hematochezia, no melena, no hematuria, no hematemesis.  No hemoptysis.



REVIEW OF SYSTEMS:  Otherwise unobtainable.



PHYSICAL EXAMINATION:

VITAL SIGNS:  At presentation in the Emergency Room, she had a low grade fever

of 99.9 degrees Fahrenheit, pulse of 137, respiratory rate of 19, blood pressure

156/105, oxygen sats were 100%, inspired oxygen concentration was not recorded. 

At the time I saw her, she was 100% on room air.  T-max this admission 100.6

degrees Fahrenheit.

GENERAL:  She is a petite-looking, middle-aged looking, -American female,

normocephalic, atraumatic, looks her stated age, resting in bed with mildly

increased work of breathing.

HEAD, EYES, EARS, NOSE AND THROAT:  She is anicteric.  No conjunctival erythema.

 Oropharynx Dry.  No gross jugular venous distention, no thyromegaly.  Grossly,

no palpable lymph nodes in the supraclavicular or submandibular lymph node

chains.

LUNGS:  Auscultation of both lung fields unremarkable.  Lungs are clear

bilaterally, slightly diminished bilateral breath sounds.

HEART:  Heart sounds 1 and 2 are heard, regular rate and rhythm at the time of

my evaluation.  No rubs or murmurs.

ABDOMEN:  Soft, flat.  Bowel sounds are positive, nontender, no palpable

hepatosplenomegaly.

EXTREMITIES:  Without overt digital clubbing, cyanosis, no pedal edema.

SKIN:  The skin was of poor turgor.  She did have an area of induration, maybe

about 8 x 4 cm on left lateral thigh, that there was some induration, it was

tender.  No fluctuance and no pus was expressed.  Otherwise, the skin is without

cellulitis or rash and no decubitus ulcers.

NEUROLOGIC:  She had spontaneous movements to all extremities.  She did have a

little bit of hyperreflexia.  No rigidity, no obvious clonus, Babinski response

is as expected, i.e., it is nonextensor.



LABORATORY DATA:  From my review are as follows:  Admission white cell count

17,100 with a hemoglobin of 12.9, hematocrit of 39.5, platelet count of 475.  No

manual differential.  INR 1.16.  Arterial blood gas showed a pH of 7.46, pCO2 of

32, pO2 of 92 on room air.  Serum sodium was 151, potassium 4.2, chloride 113,

bicarb 21, BUN was 37, creatinine 1.2, glucose 159.  Lactic acid level was

within normal limits.  AST slightly elevated at 113.  CPK was up at 5716. 

Urinalysis negative for nitrites and leukocyte esterase, 8 white cells per high

power field.  CSF studies:  2 white cells, rbc was elevated at 54.  CSF protein

was 56 and glucose was 94.  Tylenol and aspirin levels were nondetectable. 

Lithium level was within expected limits.



RADIOLOGICAL DATA:  A CT scan was done of the head.  I have reviewed the report.

 It was a normal study for age.  The CT of the abdomen and pelvis was also done.

 It showed an enlarged uterus with possible leiomyomas, no acute disease.  A

chest x-ray was also done.  I have reviewed the chest x-ray and really no acute

disease.  No acute process is obvious on the chest x-ray.



ASSESSMENT AND PLAN:

1.  Acute encephalopathy.

2.  Systemic inflammatory response syndrome.

3.  Rhabdomyolysis.

4.  Hypernatremia.

5.  Elevated serum transaminases.

6.  History of bipolar disorder.

7.  History of schizophrenia.

8.  Mild metabolic acidosis.



PLAN:  We will continue to follow her clinically in the ICU, observe her

overnight while the neurology exam is completed.  She will be put on gentle

hydration in light of the hypernatremia.  I will keep her on an isotonic

solution, but in light of the mild metabolic acidosis and rhabdo, I will

alkalinize the urine somewhat.  I will put her on D5W with 1 amp of sodium

bicarbonate per liter and run that at about  mL per hour for about 3-4

liters and reevaluate.  We will use p.r.n. Ativan for any seizure-type activity.

 She will continue empiric broad-spectrum antibiotic therapy while we wait on

the results of cultures, which include blood and urine cultures.  Aspiration

precautions will be maintained.  She will be started on gastrointestinal

prophylaxis as well as DVT prophylaxis.  Accu-Cheks are going to be q.6h. and

covered with a sliding scale.  Nutrition consult will be placed.  A feeding tube

will be passed as we cannot get her to swallow at this time.  We will have

physical therapy take a look at her.  I am not convinced we are dealing with an

overt neuroleptic malignant syndrome here, but we will certainly avoid any

medications that could make that syndrome worse or pushing her towards that. 

Flu and pneumonia vaccination will be addressed per protocol.



Thank you very much for the consult.  We will follow along.  We will make

further recommendations as picture progresses/becomes clearer.  My hope is that

as early as tomorrow, we can transfer her to regular room or at monitored bed

and out of the Intensive Care Unit.





DD: 06/14/2018 14:23

DT: 06/15/2018 01:33

JOB# [id number]  [id number]

LINDSAY/DELORES

MTDD

## 2018-06-15 NOTE — EVENT NOTE
Date: 06/15/18





Discussed with Dr. Shabazz that per his note she is improving, probably since Na 

now normal at 139.  He agreed I did not need to followup.  Signing off, call 

for any questions/changes.

## 2018-06-15 NOTE — PROGRESS NOTE
Assessment and Plan


Assessment and plan: 


Per HPI documentation


53 year old woman with bipolar, schizophrenia was brought to the ER for 

evaluation of altered mental status. No further history is available. A lumbar 

puncture was done and the patient was treat emperically for meningitis and 

possible NMS, however poison control recommend supportive care. A ROS is 

unobtainable


EMS reported that patient is from a group home, unknown baseline mental status








Acute Encephalopathy Unkown Etiology


* CT brain negative.


* R/O Serotonin syndrome-Less likely


* Neurology and Pysch consultation.input noted. EEG negative. 


* EEG


* lumber puncture serology negative for Bacterial Meningitis


* ?Acute cystitis


 * Await cultures-no growth. 


SIRS


* POA, Leukocytosis, Tachycardia,


* Continue vancomycin and zosyn D#2


* cxr is negative for acute disease


* s/p one dose Acyclovir. 


Hypernatermia


* change to D5W and recheck


Mildly elevated CPK.-Possible Mild Rhabdomylysis


* EKG on admission-Sinus tachycardia, normal axis, motion artifact, QTC 

prolonged, abnormal EKG, not a STEMI per ER. Not available for my review


Bipolar disorder


Group Home Resident


Schizophrenia


* On Respiradone and ativan on MED REC


* Resume PRN ativan to prevent withdrawal


Transfer to [facility]





The high probability of a clinically significant, sudden or life threatening 

deterioration of the [NEURO] system(s) required my full and direct attention, 

intervention and personal management. The aggregate critical care time was [35] 

minutes. This time is in addition to time spent performing reported procedures 

but includes the following: 





[X] Data Review and interpretation 





[X] Patient assessment and monitoring of vital signs 





[X] Documentation 





[X] Medication orders and management





History


Interval history: 


Patient seen and examined still confused this morning, and lathergic, but some 

improvement. following some commands. 





Hospitalist Physical





- Physical exam


Narrative exam: 


Gen. appearance: Patient lying in bed, no apparent distress


HEENT: Normocephalic, atraumatic, pupils equally round and reactive to light, 

pinpoint, unable to do extraocular movement, and no sclericterus,. No JVD or 

thyromegaly or nodule,neck supple, no carotid bruit ,mucous membranes moist, no 

exudate or erythema


Heart: S1, S2, regular rate and rhythm


Lungs: Clear anteriorly bilaterally, breathing comfortable


Abdomen: Positive bowel sounds, soft, nondistended, no organomegaly


Extremity:no edema,  cyanosis, clubbing


Neuro: lathargic, not answering questions but mumbles some incomprehensible 

words. follows some commands














- Constitutional


Vitals: 


 











Temp Pulse Resp BP Pulse Ox


 


 97.8 F   95 H  22   125/95   100 


 


 06/15/18 03:40  06/15/18 15:50  06/15/18 16:00  06/15/18 16:00  06/15/18 16:00














Results





- Labs


CBC & Chem 7: 


 06/14/18 04:25





 06/15/18 15:32


Labs: 


 Laboratory Last Values











WBC  19.6 K/mm3 (4.5-11.0)  H  06/14/18  04:25    


 


RBC  3.77 M/mm3 (3.65-5.03)   06/14/18  04:25    


 


Hgb  11.4 gm/dl (10.1-14.3)   06/14/18  04:25    


 


Hct  35.3 % (30.3-42.9)   06/14/18  04:25    


 


MCV  94 fl (79-97)   06/14/18  04:25    


 


MCH  31 pg (28-32)   06/14/18  04:25    


 


MCHC  33 % (30-34)   06/14/18  04:25    


 


RDW  12.5 % (13.2-15.2)  L  06/14/18  04:25    


 


Plt Count  339 K/mm3 (140-440)   06/14/18  04:25    


 


Lymph % (Auto)  15.9 % (13.4-35.0)   06/14/18  04:25    


 


Mono % (Auto)  9.4 % (0.0-7.3)  H  06/14/18  04:25    


 


Eos % (Auto)  0.1 % (0.0-4.3)   06/14/18  04:25    


 


Baso % (Auto)  0.4 % (0.0-1.8)   06/14/18  04:25    


 


Lymph #  3.1 K/mm3 (1.2-5.4)   06/14/18  04:25    


 


Mono #  1.8 K/mm3 (0.0-0.8)  H  06/14/18  04:25    


 


Eos #  0.0 K/mm3 (0.0-0.4)   06/14/18  04:25    


 


Baso #  0.1 K/mm3 (0.0-0.1)   06/14/18  04:25    


 


Seg Neutrophils %  74.2 % (40.0-70.0)  H  06/14/18  04:25    


 


Seg Neutrophils #  14.5 K/mm3 (1.8-7.7)  H  06/14/18  04:25    


 


ESR  32 mm/Hr (0-20)   06/13/18  20:53    


 


PT  15.4 Sec. (12.2-14.9)  H  06/13/18  20:26    


 


INR  1.16  (0.87-1.13)  H  06/13/18  20:26    


 


POC ABG pH  7.460  (7.35-7.45)  H  06/13/18  21:10    


 


POC ABG pCO2  32.3  (35-45)  L  06/13/18  21:10    


 


POC ABG pO2  92  ()   06/13/18  21:10    


 


POC ABG HCO3  23.0   06/13/18  21:10    


 


POC ABG Total CO2  24   06/13/18  21:10    


 


POC ABG O2 Sat  98   06/13/18  21:10    


 


POC ABG Base Excess  -1   06/13/18  21:10    


 


VBG pH  7.387  (7.320-7.420)   06/13/18  20:26    


 


FiO2  21 %  06/13/18  21:10    


 


Sodium  139 mmol/L (137-145)  D 06/15/18  15:32    


 


Potassium  3.4 mmol/L (3.6-5.0)  L  06/15/18  15:32    


 


Chloride  102.1 mmol/L ()   06/15/18  15:32    


 


Carbon Dioxide  25 mmol/L (22-30)   06/15/18  15:32    


 


Anion Gap  15 mmol/L  06/15/18  15:32    


 


BUN  5 mg/dL (7-17)  L  06/15/18  15:32    


 


Creatinine  0.6 mg/dL (0.7-1.2)  L  06/15/18  15:32    


 


Estimated GFR  > 60 ml/min  06/15/18  15:32    


 


BUN/Creatinine Ratio  8 %  06/15/18  15:32    


 


Glucose  98 mg/dL ()   06/15/18  15:32    


 


POC Glucose  93  ()   06/15/18  11:08    


 


Lactic Acid  1.30 mmol/L (0.7-2.0)   06/13/18  23:41    


 


Calcium  8.6 mg/dL (8.4-10.2)   06/15/18  15:32    


 


Magnesium  2.40 mg/dL (1.7-2.3)  H  06/13/18  20:53    


 


Total Bilirubin  0.50 mg/dL (0.1-1.2)   06/13/18  20:26    


 


AST  113 units/L (5-40)  H  06/13/18  20:26    


 


ALT  43 units/L (7-56)   06/13/18  20:26    


 


Alkaline Phosphatase  76 units/L ()   06/13/18  20:26    


 


Total Creatine Kinase  3884 units/L ()  H  06/14/18  13:00    


 


CK-MB (CK-2)  21.0 ng/mL (0.0-4.0)  H  06/14/18  13:00    


 


CK-MB (CK-2) Rel Index  0.5  (0-4)   06/14/18  13:00    


 


Troponin T  < 0.010 ng/mL (0.00-0.029)   06/14/18  13:00    


 


C-Reactive Protein  0.90 mg/dL (0.00-1.30)   06/13/18  20:53    


 


Total Protein  7.3 g/dL (6.3-8.2)   06/13/18  20:26    


 


Albumin  4.0 g/dL (3.9-5)   06/13/18  20:26    


 


Albumin/Globulin Ratio  1.2 %  06/13/18  20:26    


 


Urine Color  Yellow  (Yellow)   06/13/18  20:05    


 


Urine Turbidity  Clear  (Clear)   06/13/18  20:05    


 


Urine pH  5.0  (5.0-7.0)   06/13/18  20:05    


 


Ur Specific Gravity  1.028  (1.003-1.030)   06/13/18  20:05    


 


Urine Protein  >500 mg/dL (Negative)   06/13/18  20:05    


 


Urine Glucose (UA)  50 mg/dL (Negative)   06/13/18  20:05    


 


Urine Ketones  20 mg/dL (Negative)   06/13/18  20:05    


 


Urine Blood  Mod  (Negative)   06/13/18  20:05    


 


Urine Nitrite  Neg  (Negative)   06/13/18  20:05    


 


Urine Bilirubin  Neg  (Negative)   06/13/18  20:05    


 


Urine Urobilinogen  < 2.0 mg/dL (<2.0)   06/13/18  20:05    


 


Ur Leukocyte Esterase  Neg  (Negative)   06/13/18  20:05    


 


Urine WBC (Auto)  8.0 /HPF (0.0-6.0)  H  06/13/18  20:05    


 


Urine RBC (Auto)  3.0 /HPF (0.0-6.0)   06/13/18  20:05    


 


U Epithel Cells (Auto)  1.0 /HPF (0-13.0)   06/13/18  20:05    


 


Urine Bacteria (Auto)  1+ /HPF (Negative)   06/13/18  20:05    


 


Granular Casts  3 /LPF  06/13/18  20:05    


 


Urine Mucus  2+ /HPF  06/13/18  20:05    


 


CSF Appearance  Clear   06/13/18  23:00    


 


CSF Color  Colorless   06/13/18  23:00    


 


CSF WBC  2 /mm3 (1-10)   06/13/18  23:00    


 


CSF RBC  54 /mm3 (0-0)   06/13/18  23:00    


 


CSF Seg Neutrophils  14.3 % (0-6)   06/13/18  23:00    


 


CSF Lymphocytes %  85.7 % (40-80)   06/13/18  23:00    


 


CSF Reactive Lymphs  0 %  06/13/18  23:00    


 


CSF Monocytes %  0 % (15-45)   06/13/18  23:00    


 


CSF Eosinophils %  0 %  06/13/18  23:00    


 


CSF Basophils  0 %  06/13/18  23:00    


 


CSF Comment  Diff performed   06/13/18  23:00    


 


CSF Pathologist Review  C   06/13/18  23:00    


 


CSF Glucose  94 mg/dL  06/13/18  23:00    


 


CSF Total Protein  56 mg/dL  06/13/18  23:00    


 


Salicylates  < 0.3 mg/dL (2.8-20.0)  L  06/13/18  20:53    


 


Acetaminophen  < 5.0 ug/mL (10.0-30.0)  L  06/13/18  20:53    


 


Lithium  0.1 mmol/L (0.0-1.2)   06/13/18  20:53

## 2018-06-15 NOTE — PROGRESS NOTE
Assessment and Plan








Acute encephalopathy.


Systemic inflammatory response syndrome.


Rhabdomyolysis.


Hypernatremia.


Elevated serum transaminases.


History of bipolar disorder.


History of schizophrenia.


Mild metabolic acidosis





- encephalopathy improving


- stop free water as hypernatremia resolved


- place feeding tube and begin enteral nutrition


- swallow evaluation


- aspiration precautions


- get psychiatry evaluation and resume home psych meds (per daughter not too 

far from baseline)


- de-escalate antibiotics (will stop and observe clinically with CRP of 0.9)


- continue GI & VTE prophylaxis


- transfer to medical floor


- PT/OT to evaluate and treat





... care plan discussed with daughter in room





.... 35'








Subjective


Date of service: 06/15/18


Principal diagnosis: Acute Encephalopathy; SIRS; Hypernatremia; Rhabdomyolysis; 

Bipolar Disorder


Interval history: 





Patient is seen today for: Acute Encephalopathy; SIRS; Hypernatremia; 

Rhabdomyolysis; Bipolar Disorder





Seen and examined at bedside; 24hour events reviewed; nursing and respiratory 

care staff consulted; no adverse overnight events reported to me; resting in bed

; daughter visiting; a little more alert / responsive; No N/V/F/C; not 

cooperating with feeding








Objective


 Vital Signs - 12hr











  06/15/18 06/15/18 06/15/18





  00:00 00:10 00:20


 


Temperature   


 


Pulse Rate 110 H 111 H 96 H


 


Pulse Rate [   





From Monitor]   


 


Pulse Rate [   





None]   


 


Respiratory 15 16 13





Rate   


 


Blood Pressure  143/100 143/100


 


O2 Sat by Pulse 92 99 100





Oximetry   














  06/15/18 06/15/18 06/15/18





  00:30 00:40 00:50


 


Temperature   


 


Pulse Rate 109 H 107 H 102 H


 


Pulse Rate [   





From Monitor]   


 


Pulse Rate [   





None]   


 


Respiratory 17 14 14





Rate   


 


Blood Pressure 143/100 143/100 143/100


 


O2 Sat by Pulse 100 100 99





Oximetry   














  06/15/18 06/15/18 06/15/18





  01:00 01:10 01:20


 


Temperature   


 


Pulse Rate 111 H 106 H 110 H


 


Pulse Rate [   





From Monitor]   


 


Pulse Rate [   





None]   


 


Respiratory 19 15 14





Rate   


 


Blood Pressure 131/97 131/97 131/97


 


O2 Sat by Pulse 100 100 96





Oximetry   














  06/15/18 06/15/18 06/15/18





  01:30 01:40 01:50


 


Temperature   


 


Pulse Rate 106 H 102 H 102 H


 


Pulse Rate [   





From Monitor]   


 


Pulse Rate [   





None]   


 


Respiratory 17 14 15





Rate   


 


Blood Pressure 131/97 131/97 131/97


 


O2 Sat by Pulse 99 100 99





Oximetry   














  06/15/18 06/15/18 06/15/18





  02:00 02:10 02:20


 


Temperature   


 


Pulse Rate 99 H 91 H 93 H


 


Pulse Rate [   





From Monitor]   


 


Pulse Rate [   





None]   


 


Respiratory 15 13 14





Rate   


 


Blood Pressure 112/66 112/66 112/66


 


O2 Sat by Pulse 99 99 99





Oximetry   














  06/15/18 06/15/18 06/15/18





  02:30 02:40 02:50


 


Temperature   


 


Pulse Rate 97 H 94 H 113 H


 


Pulse Rate [   





From Monitor]   


 


Pulse Rate [   





None]   


 


Respiratory 14 13 16





Rate   


 


Blood Pressure 112/66 112/66 112/66


 


O2 Sat by Pulse 98 98 99





Oximetry   














  06/15/18 06/15/18 06/15/18





  03:00 03:10 03:20


 


Temperature   


 


Pulse Rate 121 H 89 98 H


 


Pulse Rate [   





From Monitor]   


 


Pulse Rate [   





None]   


 


Respiratory 18 16 11 L





Rate   


 


Blood Pressure 112/66 154/103 154/103


 


O2 Sat by Pulse 99 100 99





Oximetry   














  06/15/18 06/15/18 06/15/18





  03:30 03:40 03:50


 


Temperature  97.8 F 


 


Pulse Rate 98 H 99 H 96 H


 


Pulse Rate [   





From Monitor]   


 


Pulse Rate [   





None]   


 


Respiratory 15 14 16





Rate   


 


Blood Pressure 154/103 154/103 154/103


 


O2 Sat by Pulse 98 98 98





Oximetry   














  06/15/18 06/15/18 06/15/18





  04:00 04:05 04:10


 


Temperature   


 


Pulse Rate 84  96 H


 


Pulse Rate [   





From Monitor]   


 


Pulse Rate [  91 H 





None]   


 


Respiratory 10 L 16 13





Rate   


 


Blood Pressure 109/74 109/74 109/74


 


O2 Sat by Pulse 100 100 97





Oximetry   














  06/15/18 06/15/18 06/15/18





  04:20 04:30 04:40


 


Temperature   


 


Pulse Rate 112 H 91 H 97 H


 


Pulse Rate [   





From Monitor]   


 


Pulse Rate [   





None]   


 


Respiratory 15 14 13





Rate   


 


Blood Pressure 109/74 109/74 109/74


 


O2 Sat by Pulse 99 99 99





Oximetry   














  06/15/18 06/15/18 06/15/18





  04:50 05:00 05:10


 


Temperature   


 


Pulse Rate 98 H 89 95 H


 


Pulse Rate [   





From Monitor]   


 


Pulse Rate [   





None]   


 


Respiratory 16 14 14





Rate   


 


Blood Pressure 109/74 83/64 83/64


 


O2 Sat by Pulse 98 99 99





Oximetry   














  06/15/18 06/15/18 06/15/18





  05:20 05:30 05:40


 


Temperature   


 


Pulse Rate 88 95 H 91 H


 


Pulse Rate [   





From Monitor]   


 


Pulse Rate [   





None]   


 


Respiratory 10 L 9 L 13





Rate   


 


Blood Pressure 83/64 83/64 83/64


 


O2 Sat by Pulse 99 99 99





Oximetry   














  06/15/18 06/15/18 06/15/18





  05:41 05:50 06:00


 


Temperature   


 


Pulse Rate 89 102 H 100 H


 


Pulse Rate [   





From Monitor]   


 


Pulse Rate [   





None]   


 


Respiratory 19 13 12





Rate   


 


Blood Pressure  83/64 86/71


 


O2 Sat by Pulse 100 98 98





Oximetry   














  06/15/18 06/15/18 06/15/18





  06:10 06:20 06:30


 


Temperature   


 


Pulse Rate 96 H 91 H 96 H


 


Pulse Rate [   





From Monitor]   


 


Pulse Rate [   





None]   


 


Respiratory 14 9 L 13





Rate   


 


Blood Pressure 86/71 86/71 86/71


 


O2 Sat by Pulse 99 100 99





Oximetry   














  06/15/18 06/15/18 06/15/18





  06:40 06:50 07:00


 


Temperature   


 


Pulse Rate 96 H 93 H 112 H


 


Pulse Rate [   





From Monitor]   


 


Pulse Rate [   





None]   


 


Respiratory 14 14 20





Rate   


 


Blood Pressure 86/71 86/71 135/98


 


O2 Sat by Pulse 99 99 97





Oximetry   














  06/15/18 06/15/18 06/15/18





  07:10 07:20 07:30


 


Temperature   


 


Pulse Rate 86 81 83


 


Pulse Rate [   





From Monitor]   


 


Pulse Rate [   





None]   


 


Respiratory 13 14 12





Rate   


 


Blood Pressure 135/98 86/71 86/71


 


O2 Sat by Pulse 100 100 100





Oximetry   














  06/15/18 06/15/18 06/15/18





  07:40 07:50 08:00


 


Temperature   


 


Pulse Rate 88 93 H 89


 


Pulse Rate [   112 H





From Monitor]   


 


Pulse Rate [   





None]   


 


Respiratory 16 12 12





Rate   


 


Blood Pressure 86/71 86/71 93/63


 


O2 Sat by Pulse 99 98 98





Oximetry   














  06/15/18 06/15/18 06/15/18





  08:10 08:20 08:30


 


Temperature   


 


Pulse Rate 108 H 89 89


 


Pulse Rate [   





From Monitor]   


 


Pulse Rate [   





None]   


 


Respiratory 17 9 L 11 L





Rate   


 


Blood Pressure 93/63 93/63 93/63


 


O2 Sat by Pulse 100 100 100





Oximetry   














  06/15/18 06/15/18 06/15/18





  08:40 08:50 09:00


 


Temperature   


 


Pulse Rate 93 H 87 92 H


 


Pulse Rate [   





From Monitor]   


 


Pulse Rate [   





None]   


 


Respiratory 11 L 11 L 12





Rate   


 


Blood Pressure 93/63 93/63 92/62


 


O2 Sat by Pulse 100 100 





Oximetry   














  06/15/18 06/15/18 06/15/18





  09:10 09:20 09:30


 


Temperature   


 


Pulse Rate 84 86 94 H


 


Pulse Rate [   





From Monitor]   


 


Pulse Rate [   





None]   


 


Respiratory 11 L 12 17





Rate   


 


Blood Pressure 92/62 92/62 92/62


 


O2 Sat by Pulse 100 100 100





Oximetry   














  06/15/18 06/15/18 06/15/18





  09:40 09:50 10:00


 


Temperature   


 


Pulse Rate 91 H 96 H 85


 


Pulse Rate [   96 H





From Monitor]   


 


Pulse Rate [   





None]   


 


Respiratory 21 12 9 L





Rate   


 


Blood Pressure 92/62 92/62 101/71


 


O2 Sat by Pulse 100 100 100





Oximetry   














  06/15/18 06/15/18 06/15/18





  10:10 10:20 10:30


 


Temperature   


 


Pulse Rate 87 87 89


 


Pulse Rate [   





From Monitor]   


 


Pulse Rate [   





None]   


 


Respiratory 11 L 11 L 12





Rate   


 


Blood Pressure 101/71 101/71 101/71


 


O2 Sat by Pulse 100 100 100





Oximetry   














  06/15/18 06/15/18 06/15/18





  10:40 10:50 11:00


 


Temperature   


 


Pulse Rate 91 H 88 95 H


 


Pulse Rate [   





From Monitor]   


 


Pulse Rate [   





None]   


 


Respiratory 14 13 11 L





Rate   


 


Blood Pressure  101/71 107/78


 


O2 Sat by Pulse 99 100 99





Oximetry   














  06/15/18





  11:10


 


Temperature 


 


Pulse Rate 87


 


Pulse Rate [ 





From Monitor] 


 


Pulse Rate [ 





None] 


 


Respiratory 10 L





Rate 


 


Blood Pressure 107/78


 


O2 Sat by Pulse 100





Oximetry 











Constitutional: no acute distress, other (middle aged AAF; normocephalic and 

atraumatic in no acute respiratory distress)


Eyes: non-icteric


ENT: oropharynx moist, other (mallampatti II)


Neck: supple, no lymphadenopathy, no JVD, other (no thyromegaly)


Effort: mildly labored


Ascultation: Bilateral: clear


Percussion: Bilateral: not dull


Cardiovascular: regular rate and rhythm, other (no R/M)


Gastrointestinal: normoactive bowel sounds, soft, non-tender, non-distended, 

other (No HSM)


Integumentary: other (left thigh tender indurated 4X6 cm area improving)


Extremities: no cyanosis, no edema, pulses normal, no ischemia or petechiae


Neurologic: non-focal exam, pupils equal and round, CN II-XII normal


Psychiatric: other (flat affect)


CBC and BMP: 


 06/16/18 05:23





 06/16/18 05:23


ABG, PT/INR, D-dimer: 


ABG











POC ABG pH  7.460  (7.35-7.45)  H  06/13/18  21:10    


 


POC ABG pCO2  32.3  (35-45)  L  06/13/18  21:10    


 


POC ABG pO2  92  ()   06/13/18  21:10    


 


POC ABG HCO3  23.0   06/13/18  21:10    


 


POC ABG Total CO2  24   06/13/18  21:10    


 


POC ABG O2 Sat  98   06/13/18  21:10    





PT/INR, D-dimer











PT  15.4 Sec. (12.2-14.9)  H  06/13/18  20:26    


 


INR  1.16  (0.87-1.13)  H  06/13/18  20:26    








Abnormal lab findings: 


 Abnormal Labs











  06/13/18 06/13/18 06/13/18





  20:05 20:26 20:26


 


WBC   17.1 H 


 


RDW   12.9 L 


 


Plt Count   475 H 


 


Lymph % (Auto)   7.7 L 


 


Mono % (Auto)   7.6 H 


 


Mono #   1.3 H 


 


Seg Neutrophils %   84.4 H 


 


Seg Neutrophils #   14.5 H 


 


PT    15.4 H


 


INR    1.16 H


 


POC ABG pH   


 


POC ABG pCO2   


 


Sodium   


 


Chloride   


 


Carbon Dioxide   


 


BUN   


 


Glucose   


 


POC Glucose   


 


Lactic Acid   


 


Calcium   


 


Magnesium   


 


AST   


 


Total Creatine Kinase   


 


CK-MB (CK-2)   


 


Urine WBC (Auto)  8.0 H  


 


Salicylates   


 


Acetaminophen   














  06/13/18 06/13/18 06/13/18





  20:26 20:26 20:53


 


WBC   


 


RDW   


 


Plt Count   


 


Lymph % (Auto)   


 


Mono % (Auto)   


 


Mono #   


 


Seg Neutrophils %   


 


Seg Neutrophils #   


 


PT   


 


INR   


 


POC ABG pH   


 


POC ABG pCO2   


 


Sodium  151 H  


 


Chloride  112.5 H  


 


Carbon Dioxide  21 L  


 


BUN  37 H  


 


Glucose  159 H  


 


POC Glucose   


 


Lactic Acid   3.10 H* 


 


Calcium   


 


Magnesium    2.40 H


 


AST  113 H  


 


Total Creatine Kinase    5716 H


 


CK-MB (CK-2)   


 


Urine WBC (Auto)   


 


Salicylates   


 


Acetaminophen   














  06/13/18 06/13/18 06/13/18





  20:53 20:53 21:10


 


WBC   


 


RDW   


 


Plt Count   


 


Lymph % (Auto)   


 


Mono % (Auto)   


 


Mono #   


 


Seg Neutrophils %   


 


Seg Neutrophils #   


 


PT   


 


INR   


 


POC ABG pH    7.460 H


 


POC ABG pCO2    32.3 L


 


Sodium   


 


Chloride   


 


Carbon Dioxide   


 


BUN   


 


Glucose   


 


POC Glucose   


 


Lactic Acid   


 


Calcium   


 


Magnesium   


 


AST   


 


Total Creatine Kinase   


 


CK-MB (CK-2)   


 


Urine WBC (Auto)   


 


Salicylates  < 0.3 L  


 


Acetaminophen   < 5.0 L 














  06/14/18 06/14/18 06/14/18





  01:39 04:25 04:25


 


WBC   19.6 H 


 


RDW   12.5 L 


 


Plt Count   


 


Lymph % (Auto)   


 


Mono % (Auto)   9.4 H 


 


[place] #   1.8 H 


 


Seg Neutrophils %   74.2 H 


 


Seg Neutrophils #   14.5 H 


 


PT   


 


INR   


 


POC ABG pH   


 


POC ABG pCO2   


 


Sodium    154 H


 


Chloride    118.0 H


 


Carbon Dioxide    21 L


 


BUN    25 H


 


Glucose    117 H


 


POC Glucose   


 


Lactic Acid   


 


Calcium    8.1 L


 


Magnesium   


 


AST   


 


Total Creatine Kinase  5392 H  


 


CK-MB (CK-2)  40.1 H  


 


Urine WBC (Auto)   


 


Salicylates   


 


Acetaminophen   














  06/14/18 06/14/18 06/15/18





  11:46 13:00 05:40


 


WBC   


 


RDW   


 


Plt Count   


 


Lymph % (Auto)   


 


Mono % (Auto)   


 


Mono #   


 


Seg Neutrophils %   


 


Seg Neutrophils #   


 


PT   


 


INR   


 


POC ABG pH   


 


POC ABG pCO2   


 


Sodium   


 


Chloride   


 


Carbon Dioxide   


 


BUN   


 


Glucose   


 


POC Glucose  117 H   110 H


 


Lactic Acid   


 


Calcium   


 


Magnesium   


 


AST   


 


Total Creatine Kinase   3884 H 


 


CK-MB (CK-2)   21.0 H 


 


Urine WBC (Auto)   


 


Salicylates   


 


Acetaminophen   











Chest x-ray: image reviewed

## 2018-06-16 LAB
BUN SERPL-MCNC: 4 MG/DL (ref 7–17)
BUN/CREAT SERPL: 8 %
CALCIUM SERPL-MCNC: 8.3 MG/DL (ref 8.4–10.2)
HCT VFR BLD CALC: 36 % (ref 30.3–42.9)
HEMOLYSIS INDEX: 5
HGB BLD-MCNC: 12.2 GM/DL (ref 10.1–14.3)
MCH RBC QN AUTO: 31 PG (ref 28–32)
MCHC RBC AUTO-ENTMCNC: 34 % (ref 30–34)
MCV RBC AUTO: 91 FL (ref 79–97)
PLATELET # BLD: 397 K/MM3 (ref 140–440)
RBC # BLD AUTO: 3.96 M/MM3 (ref 3.65–5.03)

## 2018-06-16 RX ADMIN — Medication SCH ML: at 22:33

## 2018-06-16 RX ADMIN — ENOXAPARIN SODIUM SCH MG: 100 INJECTION SUBCUTANEOUS at 09:52

## 2018-06-16 RX ADMIN — BENZTROPINE MESYLATE SCH MG: 1 INJECTION, SOLUTION INTRAMUSCULAR; INTRAVENOUS at 14:07

## 2018-06-16 RX ADMIN — BENZTROPINE MESYLATE SCH MG: 1 INJECTION, SOLUTION INTRAMUSCULAR; INTRAVENOUS at 03:26

## 2018-06-16 RX ADMIN — DEXTROSE SCH MLS/HR: 5 SOLUTION INTRAVENOUS at 14:06

## 2018-06-16 RX ADMIN — DEXTROSE SCH MLS/HR: 5 SOLUTION INTRAVENOUS at 06:03

## 2018-06-16 RX ADMIN — LORAZEPAM SCH MG: 0.5 TABLET ORAL at 22:33

## 2018-06-16 RX ADMIN — Medication SCH ML: at 09:53

## 2018-06-16 NOTE — PROGRESS NOTE
Subjective


Date of service: 06/16/18


Interval history: 





Patient is seen today for: 





Seen and examined at bedside; 24hour events reviewed; nursing and respiratory 

care staff consulted; no adverse overnight events reported to me;








Objective


 Vital Signs - 12hr











  06/16/18 06/16/18





  07:29 10:00


 


Temperature 98.1 F 


 


Pulse Rate 105 H 


 


Respiratory 19 





Rate  


 


Blood Pressure 136/78 


 


O2 Sat by Pulse 98 99





Oximetry  











CBC and BMP: 


 06/16/18 05:23





 06/16/18 05:23


ABG, PT/INR, D-dimer: 


ABG











POC ABG pH  7.460  (7.35-7.45)  H  06/13/18  21:10    


 


POC ABG pCO2  32.3  (35-45)  L  06/13/18  21:10    


 


POC ABG pO2  92  ()   06/13/18  21:10    


 


POC ABG HCO3  23.0   06/13/18  21:10    


 


POC ABG Total CO2  24   06/13/18  21:10    


 


POC ABG O2 Sat  98   06/13/18  21:10    





PT/INR, D-dimer











PT  15.4 Sec. (12.2-14.9)  H  06/13/18  20:26    


 


INR  1.16  (0.87-1.13)  H  06/13/18  20:26    








Abnormal lab findings: 


 Abnormal Labs











  06/13/18 06/13/18 06/13/18





  20:05 20:26 20:26


 


WBC   17.1 H 


 


RDW   12.9 L 


 


Plt Count   475 H 


 


Lymph % (Auto)   7.7 L 


 


Mono % (Auto)   7.6 H 


 


Mono #   1.3 H 


 


Seg Neutrophils %   84.4 H 


 


Seg Neutrophils #   14.5 H 


 


PT    15.4 H


 


INR    1.16 H


 


POC ABG pH   


 


POC ABG pCO2   


 


Sodium   


 


Potassium   


 


Chloride   


 


Carbon Dioxide   


 


BUN   


 


Creatinine   


 


Glucose   


 


POC Glucose   


 


Lactic Acid   


 


Calcium   


 


Magnesium   


 


AST   


 


Total Creatine Kinase   


 


CK-MB (CK-2)   


 


Urine WBC (Auto)  8.0 H  


 


Salicylates   


 


Acetaminophen   














  06/13/18 06/13/18 06/13/18





  20:26 20:26 20:53


 


WBC   


 


RDW   


 


Plt Count   


 


Lymph % (Auto)   


 


Mono % (Auto)   


 


Mono #   


 


Seg Neutrophils %   


 


Seg Neutrophils #   


 


PT   


 


INR   


 


POC ABG pH   


 


POC ABG pCO2   


 


Sodium  151 H  


 


Potassium   


 


Chloride  112.5 H  


 


Carbon Dioxide  21 L  


 


BUN  37 H  


 


Creatinine   


 


Glucose  159 H  


 


POC Glucose   


 


Lactic Acid   3.10 H* 


 


Calcium   


 


Magnesium    2.40 H


 


AST  113 H  


 


Total Creatine Kinase    5716 H


 


CK-MB (CK-2)   


 


Urine WBC (Auto)   


 


Salicylates   


 


Acetaminophen   














  06/13/18 06/13/18 06/13/18





  20:53 20:53 21:10


 


WBC   


 


RDW   


 


Plt Count   


 


Lymph % (Auto)   


 


Mono % (Auto)   


 


Mono #   


 


Seg Neutrophils %   


 


Seg Neutrophils #   


 


PT   


 


INR   


 


POC ABG pH    7.460 H


 


POC ABG pCO2    32.3 L


 


Sodium   


 


Potassium   


 


Chloride   


 


Carbon Dioxide   


 


BUN   


 


Creatinine   


 


Glucose   


 


POC Glucose   


 


Lactic Acid   


 


Calcium   


 


Magnesium   


 


AST   


 


Total Creatine Kinase   


 


CK-MB (CK-2)   


 


Urine WBC (Auto)   


 


Salicylates  < 0.3 L  


 


Acetaminophen   < 5.0 L 














  06/14/18 06/14/18 06/14/18





  01:39 04:25 04:25


 


WBC   19.6 H 


 


RDW   12.5 L 


 


Plt Count   


 


Lymph % (Auto)   


 


Mono % (Auto)   9.4 H 


 


[place] #   1.8 H 


 


Seg Neutrophils %   74.2 H 


 


Seg Neutrophils #   14.5 H 


 


PT   


 


INR   


 


POC ABG pH   


 


POC ABG pCO2   


 


Sodium    154 H


 


Potassium   


 


Chloride    118.0 H


 


Carbon Dioxide    21 L


 


BUN    25 H


 


Creatinine   


 


Glucose    117 H


 


POC Glucose   


 


Lactic Acid   


 


Calcium    8.1 L


 


Magnesium   


 


AST   


 


Total Creatine Kinase  5392 H  


 


CK-MB (CK-2)  40.1 H  


 


Urine WBC (Auto)   


 


Salicylates   


 


Acetaminophen   














  06/14/18 06/14/18 06/14/18





  11:46 13:00 17:02


 


WBC   


 


RDW   


 


Plt Count   


 


Lymph % (Auto)   


 


Mono % (Auto)   


 


Mono #   


 


Seg Neutrophils %   


 


Seg Neutrophils #   


 


PT   


 


INR   


 


POC ABG pH   


 


POC ABG pCO2   


 


Sodium   


 


Potassium   


 


Chloride   


 


Carbon Dioxide   


 


BUN   


 


Creatinine   


 


Glucose   


 


POC Glucose  117 H   106 H


 


Lactic Acid   


 


Calcium   


 


Magnesium   


 


AST   


 


Total Creatine Kinase   3884 H 


 


CK-MB (CK-2)   21.0 H 


 


Urine WBC (Auto)   


 


Salicylates   


 


Acetaminophen   














  06/15/18 06/15/18 06/15/18





  05:40 15:32 21:26


 


WBC   


 


RDW   


 


Plt Count   


 


Lymph % (Auto)   


 


Mono % (Auto)   


 


Mono #   


 


Seg Neutrophils %   


 


Seg Neutrophils #   


 


PT   


 


INR   


 


POC ABG pH   


 


POC ABG pCO2   


 


Sodium   


 


Potassium   3.4 L 


 


Chloride   


 


Carbon Dioxide   


 


BUN   5 L 


 


Creatinine   0.6 L 


 


Glucose   


 


POC Glucose  110 H   116 H


 


Lactic Acid   


 


Calcium   


 


Magnesium   


 


AST   


 


Total Creatine Kinase   


 


CK-MB (CK-2)   


 


Urine WBC (Auto)   


 


Salicylates   


 


Acetaminophen   














  06/16/18 06/16/18 06/16/18





  05:23 05:23 12:17


 


WBC  12.2 H  


 


RDW  12.5 L  


 


Plt Count   


 


Lymph % (Auto)   


 


Mono % (Auto)   


 


Mono #   


 


Seg Neutrophils %   


 


Seg Neutrophils #   


 


PT   


 


INR   


 


POC ABG pH   


 


POC ABG pCO2   


 


Sodium   135 L 


 


Potassium   3.0 L 


 


Chloride   96.2 L 


 


Carbon Dioxide   


 


BUN   4 L 


 


Creatinine   0.5 L 


 


Glucose   107 H 


 


POC Glucose    136 H


 


Lactic Acid   


 


Calcium   8.3 L 


 


Magnesium   


 


AST   


 


Total Creatine Kinase   


 


CK-MB (CK-2)   


 


Urine WBC (Auto)   


 


Salicylates   


 


Acetaminophen

## 2018-06-16 NOTE — PROGRESS NOTE
Assessment and Plan


Assessment and plan: 


Per HPI documentation


53 year old woman with bipolar, schizophrenia was brought to the ER for 

evaluation of altered mental status. No further history is available. A lumbar 

puncture was done and the patient was treat emperically for meningitis and 

possible NMS, however poison control recommend supportive care. A ROS is 

unobtainable


EMS reported that patient is from a group home, unknown baseline mental status








Acute Encephalopathy Unkown Etiology


* CT brain negative.


* R/O Serotonin syndrome-Less likely


* Neurology and Pysch consultation-input noted. EEG negative. 


* lumber puncture serology AND crypto culture negative for Bacterial Meningitis


* ?Acute cystitis


 * Await cultures-no growth. 


SIRS


* POA, Leukocytosis, Tachycardia,


* Continue vancomycin and zosyn D#4-Descalate in AM


* cxr is negative for acute disease


* s/p one dose Acyclovir. 


Hypernatermia


* Improved, stop D5 once tolerating diet.


Mildly elevated CPK.-Possible Mild Rhabdomylysis


* EKG on admission-Sinus tachycardia, normal axis, motion artifact, QTC 

prolonged, abnormal EKG, not a STEMI per ER. Not available for my review


Bipolar disorder


* Restart home meds


Group Home Resident


* Will return on discharge


Schizophrenia


* On Respiradone and ativan on MED REC


* Resume PRN ativan to prevent withdrawal


DVT/GI prophy


No family present





History


Interval history: 


Patient seen and examined still confused this morning, and lethargic, but some 

improvement. following some commands. 





Hospitalist Physical





- Physical exam


Narrative exam: 


Gen. appearance: Patient lying in bed, no apparent distress


HEENT: Normocephalic, atraumatic, pupils equally round and reactive to light, 

pinpoint, unable to do extraocular movement, and no sclericterus,. No JVD or 

thyromegaly or nodule,neck supple, no carotid bruit ,mucous membranes moist, no 

exudate or erythema


Heart: S1, S2, regular rate and rhythm


Lungs: Clear anteriorly bilaterally, breathing comfortable


Abdomen: Positive bowel sounds, soft, nondistended, no organomegaly


Extremity:no edema,  cyanosis, clubbing


Neuro: lathargic, not answering questions but mumbles some incomprehensible 

words. follows some commands














- Constitutional


Vitals: 


 











Temp Pulse Resp BP Pulse Ox


 


 99.2 F   110 H  20   132/84   100 


 


 06/15/18 21:52  06/15/18 21:52  06/15/18 21:52  06/15/18 21:52  06/15/18 16:00














Results





- Labs


CBC & Chem 7: 


 06/16/18 05:23





 06/16/18 05:23


Labs: 


 Laboratory Last Values











WBC  12.2 K/mm3 (4.5-11.0)  H  06/16/18  05:23    


 


RBC  3.96 M/mm3 (3.65-5.03)   06/16/18  05:23    


 


Hgb  12.2 gm/dl (10.1-14.3)   06/16/18  05:23    


 


Hct  36.0 % (30.3-42.9)   06/16/18  05:23    


 


MCV  91 fl (79-97)   06/16/18  05:23    


 


MCH  31 pg (28-32)   06/16/18  05:23    


 


MCHC  34 % (30-34)   06/16/18  05:23    


 


RDW  12.5 % (13.2-15.2)  L  06/16/18  05:23    


 


Plt Count  397 K/mm3 (140-440)   06/16/18  05:23    


 


Lymph % (Auto)  15.9 % (13.4-35.0)   06/14/18  04:25    


 


Mono % (Auto)  9.4 % (0.0-7.3)  H  06/14/18  04:25    


 


Eos % (Auto)  0.1 % (0.0-4.3)   06/14/18  04:25    


 


Baso % (Auto)  0.4 % (0.0-1.8)   06/14/18  04:25    


 


Lymph #  3.1 K/mm3 (1.2-5.4)   06/14/18  04:25    


 


Mono #  1.8 K/mm3 (0.0-0.8)  H  06/14/18  04:25    


 


Eos #  0.0 K/mm3 (0.0-0.4)   06/14/18  04:25    


 


Baso #  0.1 K/mm3 (0.0-0.1)   06/14/18  04:25    


 


Seg Neutrophils %  74.2 % (40.0-70.0)  H  06/14/18  04:25    


 


Seg Neutrophils #  14.5 K/mm3 (1.8-7.7)  H  06/14/18  04:25    


 


ESR  32 mm/Hr (0-20)   06/13/18  20:53    


 


PT  15.4 Sec. (12.2-14.9)  H  06/13/18  20:26    


 


INR  1.16  (0.87-1.13)  H  06/13/18  20:26    


 


POC ABG pH  7.460  (7.35-7.45)  H  06/13/18  21:10    


 


POC ABG pCO2  32.3  (35-45)  L  06/13/18  21:10    


 


POC ABG pO2  92  ()   06/13/18  21:10    


 


POC ABG HCO3  23.0   06/13/18  21:10    


 


POC ABG Total CO2  24   06/13/18  21:10    


 


POC ABG O2 Sat  98   06/13/18  21:10    


 


POC ABG Base Excess  -1   06/13/18  21:10    


 


VBG pH  7.387  (7.320-7.420)   06/13/18  20:26    


 


FiO2  21 %  06/13/18  21:10    


 


Sodium  139 mmol/L (137-145)  D 06/15/18  15:32    


 


Potassium  3.4 mmol/L (3.6-5.0)  L  06/15/18  15:32    


 


Chloride  102.1 mmol/L ()   06/15/18  15:32    


 


Carbon Dioxide  25 mmol/L (22-30)   06/15/18  15:32    


 


Anion Gap  15 mmol/L  06/15/18  15:32    


 


BUN  5 mg/dL (7-17)  L  06/15/18  15:32    


 


Creatinine  0.6 mg/dL (0.7-1.2)  L  06/15/18  15:32    


 


Estimated GFR  > 60 ml/min  06/15/18  15:32    


 


BUN/Creatinine Ratio  8 %  06/15/18  15:32    


 


Glucose  98 mg/dL ()   06/15/18  15:32    


 


POC Glucose  98  ()   06/16/18  05:31    


 


Lactic Acid  1.30 mmol/L (0.7-2.0)   06/13/18  23:41    


 


Calcium  8.6 mg/dL (8.4-10.2)   06/15/18  15:32    


 


Magnesium  2.40 mg/dL (1.7-2.3)  H  06/13/18  20:53    


 


Total Bilirubin  0.50 mg/dL (0.1-1.2)   06/13/18  20:26    


 


AST  113 units/L (5-40)  H  06/13/18  20:26    


 


ALT  43 units/L (7-56)   06/13/18  20:26    


 


Alkaline Phosphatase  76 units/L ()   06/13/18  20:26    


 


Total Creatine Kinase  3884 units/L ()  H  06/14/18  13:00    


 


CK-MB (CK-2)  21.0 ng/mL (0.0-4.0)  H  06/14/18  13:00    


 


CK-MB (CK-2) Rel Index  0.5  (0-4)   06/14/18  13:00    


 


Troponin T  < 0.010 ng/mL (0.00-0.029)   06/14/18  13:00    


 


C-Reactive Protein  0.90 mg/dL (0.00-1.30)   06/13/18  20:53    


 


Total Protein  7.3 g/dL (6.3-8.2)   06/13/18  20:26    


 


Albumin  4.0 g/dL (3.9-5)   06/13/18  20:26    


 


Albumin/Globulin Ratio  1.2 %  06/13/18  20:26    


 


Urine Color  Yellow  (Yellow)   06/13/18  20:05    


 


Urine Turbidity  Clear  (Clear)   06/13/18  20:05    


 


Urine pH  5.0  (5.0-7.0)   06/13/18  20:05    


 


Ur Specific Gravity  1.028  (1.003-1.030)   06/13/18  20:05    


 


Urine Protein  >500 mg/dL (Negative)   06/13/18  20:05    


 


Urine Glucose (UA)  50 mg/dL (Negative)   06/13/18  20:05    


 


Urine Ketones  20 mg/dL (Negative)   06/13/18  20:05    


 


Urine Blood  Mod  (Negative)   06/13/18  20:05    


 


Urine Nitrite  Neg  (Negative)   06/13/18  20:05    


 


Urine Bilirubin  Neg  (Negative)   06/13/18  20:05    


 


Urine Urobilinogen  < 2.0 mg/dL (<2.0)   06/13/18  20:05    


 


Ur Leukocyte Esterase  Neg  (Negative)   06/13/18  20:05    


 


Urine WBC (Auto)  8.0 /HPF (0.0-6.0)  H  06/13/18  20:05    


 


Urine RBC (Auto)  3.0 /HPF (0.0-6.0)   06/13/18  20:05    


 


U Epithel Cells (Auto)  1.0 /HPF (0-13.0)   06/13/18  20:05    


 


Urine Bacteria (Auto)  1+ /HPF (Negative)   06/13/18  20:05    


 


Granular Casts  3 /LPF  06/13/18  20:05    


 


Urine Mucus  2+ /HPF  06/13/18  20:05    


 


CSF Appearance  Clear   06/13/18  23:00    


 


CSF Color  Colorless   06/13/18  23:00    


 


CSF WBC  2 /mm3 (1-10)   06/13/18  23:00    


 


CSF RBC  54 /mm3 (0-0)   06/13/18  23:00    


 


CSF Seg Neutrophils  14.3 % (0-6)   06/13/18  23:00    


 


CSF Lymphocytes %  85.7 % (40-80)   06/13/18  23:00    


 


CSF Reactive Lymphs  0 %  06/13/18  23:00    


 


CSF Monocytes %  0 % (15-45)   06/13/18  23:00    


 


CSF Eosinophils %  0 %  06/13/18  23:00    


 


CSF Basophils  0 %  06/13/18  23:00    


 


CSF Comment  Diff performed   06/13/18  23:00    


 


CSF Pathologist Review  C   06/13/18  23:00    


 


CSF Glucose  94 mg/dL  06/13/18  23:00    


 


CSF Total Protein  56 mg/dL  06/13/18  23:00    


 


Salicylates  < 0.3 mg/dL (2.8-20.0)  L  06/13/18  20:53    


 


Acetaminophen  < 5.0 ug/mL (10.0-30.0)  L  06/13/18  20:53    


 


Lithium  0.1 mmol/L (0.0-1.2)   06/13/18  20:53

## 2018-06-17 RX ADMIN — Medication SCH ML: at 22:59

## 2018-06-17 RX ADMIN — ENOXAPARIN SODIUM SCH MG: 100 INJECTION SUBCUTANEOUS at 10:00

## 2018-06-17 RX ADMIN — LORAZEPAM SCH MG: 0.5 TABLET ORAL at 22:59

## 2018-06-17 RX ADMIN — DEXTROSE SCH MLS/HR: 5 SOLUTION INTRAVENOUS at 03:34

## 2018-06-17 RX ADMIN — BENZTROPINE MESYLATE SCH MG: 1 INJECTION, SOLUTION INTRAMUSCULAR; INTRAVENOUS at 14:17

## 2018-06-17 RX ADMIN — Medication SCH ML: at 10:00

## 2018-06-17 RX ADMIN — BENZTROPINE MESYLATE SCH MG: 1 INJECTION, SOLUTION INTRAMUSCULAR; INTRAVENOUS at 03:33

## 2018-06-17 RX ADMIN — DEXTROSE SCH MLS/HR: 5 SOLUTION INTRAVENOUS at 14:17

## 2018-06-17 NOTE — PROGRESS NOTE
Assessment and Plan


Assessment and plan: 


Per HPI documentation


53 year old woman with bipolar, schizophrenia was brought to the ER for 

evaluation of altered mental status. No further history is available. A lumbar 

puncture was done and the patient was treat emperically for meningitis and 

possible NMS, however poison control recommend supportive care. A ROS is 

unobtainable


EMS reported that patient is from a group home, unknown baseline mental status





Acute Encephalopathy Unkown Etiology


* CT brain negative.


* R/O Serotonin syndrome-Less likely


* Neurology and Pysch consultation-input noted. EEG negative. 


* lumber puncture serology AND crypto culture negative for Bacterial Meningitis


* ?Acute cystitis


 * Await cultures-no growth. 


SIRS


* POA, Leukocytosis, Tachycardia,


* Continue vancomycin and zosyn D#4-Descalate in AM


* cxr is negative for acute disease


* s/p one dose Acyclovir. 


Hypernatermia


* Improved, stop D5 once tolerating diet.


Mildly elevated CPK.-Possible Mild Rhabdomylysis


* EKG on admission-Sinus tachycardia, normal axis, motion artifact, QTC 

prolonged, abnormal EKG, not a STEMI per ER. Not available for my review


Bipolar disorder


* Restart home meds


* Awaiting Psych eval


Group Home Resident


* Will return on discharge


Schizophrenia


* On Respiradone and ativan on MED REC


* Resume PRN ativan to prevent withdrawal


FEN


* Continue tube feed


* Aspiration Precautions.


DVT/GI prophy


No family present














History


Interval history: 


Patient seen and examined still confused this morning, but more awake although 

speaking, not making any sense and appears to be confabulating.





Hospitalist Physical





- Physical exam


Narrative exam: 


Gen. appearance: Patient lying in bed, no apparent distress


HEENT: Normocephalic, atraumatic, pupils equally round and reactive to light, 

pinpoint, unable to do extraocular movement, and no sclericterus,. No JVD or 

thyromegaly or nodule,neck supple, no carotid bruit ,mucous membranes moist, no 

exudate or erythema


Heart: S1, S2, regular rate and rhythm


Lungs: Clear anteriorly bilaterally, breathing comfortable


Abdomen: Positive bowel sounds, soft, nondistended, no organomegaly


Extremity:no edema,  cyanosis, clubbing


Neuro: awake and speaking but not making any sense. 











- Constitutional


Vitals: 


 











Temp Pulse Resp BP Pulse Ox


 


 98.2 F   91 H  18   105/68   100 


 


 06/16/18 23:35  06/16/18 23:35  06/16/18 23:35  06/16/18 23:35  06/16/18 23:35














Results





- Labs


CBC & Chem 7: 


 06/16/18 05:23





 06/16/18 05:23


Labs: 


 Laboratory Last Values











WBC  12.2 K/mm3 (4.5-11.0)  H  06/16/18  05:23    


 


RBC  3.96 M/mm3 (3.65-5.03)   06/16/18  05:23    


 


Hgb  12.2 gm/dl (10.1-14.3)   06/16/18  05:23    


 


Hct  36.0 % (30.3-42.9)   06/16/18  05:23    


 


MCV  91 fl (79-97)   06/16/18  05:23    


 


MCH  31 pg (28-32)   06/16/18  05:23    


 


MCHC  34 % (30-34)   06/16/18  05:23    


 


RDW  12.5 % (13.2-15.2)  L  06/16/18  05:23    


 


Plt Count  397 K/mm3 (140-440)   06/16/18  05:23    


 


Lymph % (Auto)  15.9 % (13.4-35.0)   06/14/18  04:25    


 


Mono % (Auto)  9.4 % (0.0-7.3)  H  06/14/18  04:25    


 


Eos % (Auto)  0.1 % (0.0-4.3)   06/14/18  04:25    


 


Baso % (Auto)  0.4 % (0.0-1.8)   06/14/18  04:25    


 


Lymph #  3.1 K/mm3 (1.2-5.4)   06/14/18  04:25    


 


Mono #  1.8 K/mm3 (0.0-0.8)  H  06/14/18  04:25    


 


Eos #  0.0 K/mm3 (0.0-0.4)   06/14/18  04:25    


 


Baso #  0.1 K/mm3 (0.0-0.1)   06/14/18  04:25    


 


Seg Neutrophils %  74.2 % (40.0-70.0)  H  06/14/18  04:25    


 


Seg Neutrophils #  14.5 K/mm3 (1.8-7.7)  H  06/14/18  04:25    


 


ESR  32 mm/Hr (0-20)   06/13/18  20:53    


 


PT  15.4 Sec. (12.2-14.9)  H  06/13/18  20:26    


 


INR  1.16  (0.87-1.13)  H  06/13/18  20:26    


 


POC ABG pH  7.460  (7.35-7.45)  H  06/13/18  21:10    


 


POC ABG pCO2  32.3  (35-45)  L  06/13/18  21:10    


 


POC ABG pO2  92  ()   06/13/18  21:10    


 


POC ABG HCO3  23.0   06/13/18  21:10    


 


POC ABG Total CO2  24   06/13/18  21:10    


 


POC ABG O2 Sat  98   06/13/18  21:10    


 


POC ABG Base Excess  -1   06/13/18  21:10    


 


VBG pH  7.387  (7.320-7.420)   06/13/18  20:26    


 


FiO2  21 %  06/13/18  21:10    


 


Sodium  135 mmol/L (137-145)  L  06/16/18  05:23    


 


Potassium  3.0 mmol/L (3.6-5.0)  L  06/16/18  05:23    


 


Chloride  96.2 mmol/L ()  L  06/16/18  05:23    


 


Carbon Dioxide  24 mmol/L (22-30)   06/16/18  05:23    


 


Anion Gap  18 mmol/L  06/16/18  05:23    


 


BUN  4 mg/dL (7-17)  L  06/16/18  05:23    


 


Creatinine  0.5 mg/dL (0.7-1.2)  L  06/16/18  05:23    


 


Estimated GFR  > 60 ml/min  06/16/18  05:23    


 


BUN/Creatinine Ratio  8 %  06/16/18  05:23    


 


Glucose  107 mg/dL ()  H  06/16/18  05:23    


 


POC Glucose  112  ()  H  06/17/18  00:00    


 


Lactic Acid  1.30 mmol/L (0.7-2.0)   06/13/18  23:41    


 


Calcium  8.3 mg/dL (8.4-10.2)  L  06/16/18  05:23    


 


Magnesium  2.40 mg/dL (1.7-2.3)  H  06/13/18  20:53    


 


Total Bilirubin  0.50 mg/dL (0.1-1.2)   06/13/18  20:26    


 


AST  113 units/L (5-40)  H  06/13/18  20:26    


 


ALT  43 units/L (7-56)   06/13/18  20:26    


 


Alkaline Phosphatase  76 units/L ()   06/13/18  20:26    


 


Total Creatine Kinase  3884 units/L ()  H  06/14/18  13:00    


 


CK-MB (CK-2)  21.0 ng/mL (0.0-4.0)  H  06/14/18  13:00    


 


CK-MB (CK-2) Rel Index  0.5  (0-4)   06/14/18  13:00    


 


Troponin T  < 0.010 ng/mL (0.00-0.029)   06/14/18  13:00    


 


C-Reactive Protein  0.90 mg/dL (0.00-1.30)   06/13/18  20:53    


 


Total Protein  7.3 g/dL (6.3-8.2)   06/13/18  20:26    


 


Albumin  4.0 g/dL (3.9-5)   06/13/18  20:26    


 


Albumin/Globulin Ratio  1.2 %  06/13/18  20:26    


 


Urine Color  Yellow  (Yellow)   06/13/18  20:05    


 


Urine Turbidity  Clear  (Clear)   06/13/18  20:05    


 


Urine pH  5.0  (5.0-7.0)   06/13/18  20:05    


 


Ur Specific Gravity  1.028  (1.003-1.030)   06/13/18  20:05    


 


Urine Protein  >500 mg/dL (Negative)   06/13/18  20:05    


 


Urine Glucose (UA)  50 mg/dL (Negative)   06/13/18  20:05    


 


Urine Ketones  20 mg/dL (Negative)   06/13/18  20:05    


 


Urine Blood  Mod  (Negative)   06/13/18  20:05    


 


Urine Nitrite  Neg  (Negative)   06/13/18  20:05    


 


Urine Bilirubin  Neg  (Negative)   06/13/18  20:05    


 


Urine Urobilinogen  < 2.0 mg/dL (<2.0)   06/13/18  20:05    


 


Ur Leukocyte Esterase  Neg  (Negative)   06/13/18  20:05    


 


Urine WBC (Auto)  8.0 /HPF (0.0-6.0)  H  06/13/18  20:05    


 


Urine RBC (Auto)  3.0 /HPF (0.0-6.0)   06/13/18  20:05    


 


U Epithel Cells (Auto)  1.0 /HPF (0-13.0)   06/13/18  20:05    


 


Urine Bacteria (Auto)  1+ /HPF (Negative)   06/13/18  20:05    


 


Granular Casts  3 /LPF  06/13/18  20:05    


 


Urine Mucus  2+ /HPF  06/13/18  20:05    


 


CSF Appearance  Clear   06/13/18  23:00    


 


CSF Color  Colorless   06/13/18  23:00    


 


CSF WBC  2 /mm3 (1-10)   06/13/18  23:00    


 


CSF RBC  54 /mm3 (0-0)   06/13/18  23:00    


 


CSF Seg Neutrophils  14.3 % (0-6)   06/13/18  23:00    


 


CSF Lymphocytes %  85.7 % (40-80)   06/13/18  23:00    


 


CSF Reactive Lymphs  0 %  06/13/18  23:00    


 


CSF Monocytes %  0 % (15-45)   06/13/18  23:00    


 


CSF Eosinophils %  0 %  06/13/18  23:00    


 


CSF Basophils  0 %  06/13/18  23:00    


 


CSF Comment  Diff performed   06/13/18  23:00    


 


CSF Pathologist Review  C   06/13/18  23:00    


 


CSF Glucose  94 mg/dL  06/13/18  23:00    


 


CSF Total Protein  56 mg/dL  06/13/18  23:00    


 


Salicylates  < 0.3 mg/dL (2.8-20.0)  L  06/13/18  20:53    


 


Acetaminophen  < 5.0 ug/mL (10.0-30.0)  L  06/13/18  20:53    


 


Lithium  0.1 mmol/L (0.0-1.2)   06/13/18  20:53

## 2018-06-17 NOTE — CONSULTATION
History of Present Illness





- Reason for Consult


Consult date: 06/17/18


Reason for consult: schizophrenia





- Chief Complaint


Chief complaint: 


unintelligible 








- History of Present Psychiatric Illness





Ms. Mesa is a 53-year-old female seen on the medical floor. She was 

brought to the hospital by ambulance for altered mental status. She lives in 

[facility] since April, 2018.  According to the record she 

was admitted to [facility] for psychosis and discharged to this personal care 

home. According to family reports and nursing staff, this is not her baseline. 

Her nurse states she is more alert today compared to yesterday. Today, she was 

speaking to her restraint mitten in unintelligible speech.  





Medications and Allergies


 Allergies











Allergy/AdvReac Type Severity Reaction Status Date / Time


 


Unable to Assess Allergy   Unverified 06/13/18 20:11











 Home Medications











 Medication  Instructions  Recorded  Confirmed  Last Taken  Type


 


LORazepam [Ativan] 1 mg PO TID 06/13/18 06/13/18 Unknown History


 


risperiDONE [Risperdal] 2 mg PO BID 06/13/18 06/13/18 Unknown History











Active Meds: 


Active Medications





Acetaminophen (Tylenol)  650 mg PO Q4H PRN


   PRN Reason: Pain MILD(1-3)/Fever >100.5/HA


Lipase/Protease/Amylase (Pancreaze Dr 10,500 Unit)  1 each FEEDTUBE PRN PRN


   PRN Reason: For Clogged Feeding Tube


Benztropine Mesylate (Cogentin)  1 mg IV Q12H [facility]


   Last Admin: 06/17/18 14:17 Dose:  1 mg


Enoxaparin Sodium (Lovenox)  40 mg SUB-Q QDAY@1000 [facility]


   Last Admin: 06/17/18 10:00 Dose:  40 mg


Dextrose (D5w)  1,000 mls @ 125 mls/hr IV AS DIRECT [facility]


   Last Admin: 06/17/18 14:17 Dose:  125 mls/hr


Lorazepam (Ativan)  0.5 mg PO QHS [facility]


   Last Admin: 06/16/18 22:33 Dose:  0.5 mg


Risperidone (Risperdal)  1 mg PO BID [facility]


   Last Admin: 06/17/18 10:00 Dose:  1 mg


Simple Syrup (Simple Syrup)  15 ml FEEDTUBE PRN PRN


   PRN Reason: Hypoglycemia


Simple Syrup (Simple Syrup)  30 ml FEEDTUBE PRN PRN


   PRN Reason: Hypoglycemia


Sodium Bicarbonate (Sodium Bicarbonate)  325 mg FEEDTUBE PRN PRN


   PRN Reason: For Clogged Feeding Tube


Sodium Chloride (Sodium Chloride Flush Syringe 10 Ml)  10 ml IV BID JON


   Last Admin: 06/17/18 10:00 Dose:  10 ml


Sodium Chloride (Sodium Chloride Flush Syringe 10 Ml)  10 ml IV PRN PRN


   PRN Reason: LINE FLUSH











Past psychiatric history





- Past Medical History


Past Medical History: other (Hypertension, Diabetes, Hyperlipidemia, CHF)





- past Psychiatric treatment and history


Psych: Bipolar, Schizophrenia


psychiatric treatment history: 





per the record





- Social History


Social history: other (She has been in the [facility] since april, 2018 and prior to 

that with family)





Mental Status Exam





- Vital signs


 Last Vital Signs











Temp  98.2 F   06/17/18 14:50


 


Pulse  91 H  06/17/18 07:22


 


Resp  18   06/17/18 14:50


 


BP  102/57   06/17/18 14:50


 


Pulse Ox  94   06/17/18 08:30














- Exam


Narrative exam: 





unable to assess





Results


Result Diagrams: 


 06/16/18 05:23





 06/16/18 05:23


 Abnormal lab results











  06/17/18 06/17/18 Range/Units





  00:00 16:22 


 


POC Glucose  112 H  123 H  ()  








All other labs normal.








Assessment and Plan


Assessment and plan: 





Impression: Acute encephalopathy.


schizophrenia per history





Medical diagnoses:


Systemic inflammatory response syndrome.


Rhabdomyolysis.


Hypernatremia.








Recommendations:


Continue risperdal 1mg bid and ativan 0.5mg hs as ordered by the medical 

provider


Treat underlying medical conditions


Psych will monitor mental status daily





Recommend Delirium precautions below:





1. Frequently reorient patient and involve him/her in their care (simple 

explanations of procedures, tests, medications).


2. Lights on and shades open during daytime hours.


3. Write date and goals of care in a visible place.


4. Try to avoid unnecessary interruptions to sleep during nighttime hours.


5. Obtain glasses, hearing aids from home if patient uses these at baseline.


6. Avoid medications that may exacerbate delirium (especially narcotics, 

benzodiazepines, barbiturates, ambien, lunesta, and medications with excessive 

anticholinergic properties). Ativan 0.5mg hs is ordered by medical. Continuing 

the current regimen is recommended since she is reported to be improving.

## 2018-06-18 RX ADMIN — ENOXAPARIN SODIUM SCH MG: 100 INJECTION SUBCUTANEOUS at 10:27

## 2018-06-18 RX ADMIN — BENZTROPINE MESYLATE SCH MG: 1 INJECTION, SOLUTION INTRAMUSCULAR; INTRAVENOUS at 03:02

## 2018-06-18 RX ADMIN — Medication SCH ML: at 21:51

## 2018-06-18 RX ADMIN — Medication SCH ML: at 10:30

## 2018-06-18 RX ADMIN — DEXTROSE SCH MLS/HR: 5 SOLUTION INTRAVENOUS at 10:29

## 2018-06-18 RX ADMIN — DEXTROSE SCH MLS/HR: 5 SOLUTION INTRAVENOUS at 03:02

## 2018-06-18 RX ADMIN — DEXTROSE SCH MLS/HR: 5 SOLUTION INTRAVENOUS at 18:35

## 2018-06-18 NOTE — PROGRESS NOTE
Subjective





- Reason for Consult


Consult date: 06/18/18


Reason for consult: Psychiatry Follow-up





- Chief Complaint


Chief complaint: 


"AMS"





53-year-old female seen on the medical floor. She was brought to the hospital 

by ambulance for altered mental status. She lives in [facility] since April, 2018. Today the patient is calm, but confused during the 

assessment. Per collateral information from her daughter Therese Mesa at 

[phone], she stated that her mother has a psychotic do and was a patient 

at [place] in April 2018. She stated that her mother takes Risperdal and 

Ativan. She denies that her mother takes Cogentin. She stated that her mother 

is seen at [facility] for outpatient psy services. Per the staff, no 

behavioral disturbances overnight. The patient is pending a swallowing eval. 








Mental Status Exam





- Vital signs


 Last Vital Signs











Temp  98.7 F   06/18/18 07:30


 


Pulse  80   06/18/18 07:30


 


Resp  19   06/18/18 07:30


 


BP  113/72   06/18/18 07:30


 


Pulse Ox  86   06/18/18 07:30














- Exam


Narrative exam: 


The MSE could not be completed at this time because of the patient's condition. 











Assessment and Plan


Impression: Schizophrenia per history. Today the patient is calm, but confused 

during the assessment. The patient isn't in restraints, but have on mittens. 





Medical diagnoses: Acute Encephalopathy, SIRS, Rhabdomyolysis, and 

Hypernatremia.





Recommendations/Plan: Continue Risperdal 1 mg PO BID and Ativan 0.5 mg PO HS as 

ordered by the medical provider. Treat underlying medical conditions.


Psych will monitor mental status daily. The patient can follow up at [facility] 

[facility] once discharged per her daughter Therese Mesa. Recommend 

Delirium precautions below:





1. Frequently reorient patient and involve him/her in their care (simple 

explanations of procedures, tests, medications).


2. Lights on and shades open during daytime hours.


3. Write date and goals of care in a visible place.


4. Try to avoid unnecessary interruptions to sleep during nighttime hours.


5. Obtain glasses, hearing aids from home if patient uses these at baseline.


6. Avoid medications that may exacerbate delirium (especially narcotics, 

benzodiazepines, barbiturates, ambien, lunesta, and medications with excessive 

anticholinergic properties). Ativan 0.5 mg PO HS is ordered by medical. 

Continuing the current regimen is recommended since she is reported to be 

improving, it's a home medication, and to prevent benzo withdrawals.

## 2018-06-18 NOTE — PROGRESS NOTE
Assessment and Plan


Assessment and plan: 


Per HPI documentation


53 year old woman with bipolar, schizophrenia was brought to the ER for 

evaluation of altered mental status. No further history is available. A lumbar 

puncture was done and the patient was treat empirically for meningitis and 

possible NMS, but discontinued after serology did not prove to show meningitis.

  Discussion with the family member reported that patient has been in group 

home due to schizophrenia and bipolar disorder.  She was seen by neurology no 

evidence of seizure was noted.  Symptoms has improved.  Today she passed a 

swallowing eval.  Although still a little bit weak we'll monitor her for a 24-

hour significant to this improved, discharged in the morning.  





Acute Encephalopathy Unkown Etiology


* CT brain negative.


* R/O Serotonin syndrome-Less likely


* Neurology and Pysch consultation-input noted. EEG negative. 


* lumber puncture serology AND crypto culture negative for Bacterial Meningitis


Acute cystitis


 * Await cultures-no growth.  Treated with Levaquin and complete exam


SIRS


* POA, Leukocytosis, Tachycardia,


* Continue vancomycin and zosyn D#6-was changed to Levaquin At discharge 

without antibiotics.


* cxr is negative for acute disease


* s/p one dose Acyclovir. 


Hypernatermia


* Improved, stop D5 once tolerating diet.Continue Risperdal 1 mg PO BID and 

Ativan 0.5 mg PO HS as ordered by the medical provider. Treat underlying 

medical conditions.The patient can follow up at [facility] once discharged 

per her daughter Therese Mesa. 


Rhabdomylysis


* Result following the fluids


Bipolar disorder


* Restart home meds as noted above


* Psychiatric bruits noted


Group Home Resident


* Will return on discharge


Schizophrenia


* On Respiradone and ativan on MED REC


* Resume PRN ativan to prevent withdrawal


FEN


* Continue tube feed


* Aspiration Precautions.


DVT/GI prophy


No family present


Discharge in a.m. back to group home.























History


Interval history: 


Patient seen and examined, more awake although speaking, answering some 

questions appropriately but often goes off on a tangent also speaking to herself





Hospitalist Physical





- Physical exam


Narrative exam: 


Gen. appearance: Patient lying in bed, no apparent distress


HEENT: Normocephalic, atraumatic, pupils equally round and reactive to light, 

pinpoint, unable to do extraocular movement, and no sclericterus,. No JVD or 

thyromegaly or nodule,neck supple, no carotid bruit ,mucous membranes moist, no 

exudate or erythema


Heart: S1, S2, regular rate and rhythm


Lungs: Clear anteriorly bilaterally, breathing comfortable


Abdomen: Positive bowel sounds, soft, nondistended, no organomegaly


Extremity:no edema,  cyanosis, clubbing


Neuro: awake and speaking but not making any sense. 











- Constitutional


Vitals: 


 











Temp Pulse Resp BP Pulse Ox


 


 98.7 F   80   19   113/72   86 


 


 06/18/18 07:30  06/18/18 07:30  06/18/18 07:30  06/18/18 07:30  06/18/18 07:30














Results





- Labs


CBC & Chem 7: 


 06/16/18 05:23





 06/16/18 05:23


Labs: 


 Laboratory Last Values











WBC  12.2 K/mm3 (4.5-11.0)  H  06/16/18  05:23    


 


RBC  3.96 M/mm3 (3.65-5.03)   06/16/18  05:23    


 


Hgb  12.2 gm/dl (10.1-14.3)   06/16/18  05:23    


 


Hct  36.0 % (30.3-42.9)   06/16/18  05:23    


 


MCV  91 fl (79-97)   06/16/18  05:23    


 


MCH  31 pg (28-32)   06/16/18  05:23    


 


MCHC  34 % (30-34)   06/16/18  05:23    


 


RDW  12.5 % (13.2-15.2)  L  06/16/18  05:23    


 


Plt Count  397 K/mm3 (140-440)   06/16/18  05:23    


 


Lymph % (Auto)  15.9 % (13.4-35.0)   06/14/18  04:25    


 


Mono % (Auto)  9.4 % (0.0-7.3)  H  06/14/18  04:25    


 


Eos % (Auto)  0.1 % (0.0-4.3)   06/14/18  04:25    


 


Baso % (Auto)  0.4 % (0.0-1.8)   06/14/18  04:25    


 


Lymph #  3.1 K/mm3 (1.2-5.4)   06/14/18  04:25    


 


Mono #  1.8 K/mm3 (0.0-0.8)  H  06/14/18  04:25    


 


Eos #  0.0 K/mm3 (0.0-0.4)   06/14/18  04:25    


 


Baso #  0.1 K/mm3 (0.0-0.1)   06/14/18  04:25    


 


Seg Neutrophils %  74.2 % (40.0-70.0)  H  06/14/18  04:25    


 


Seg Neutrophils #  14.5 K/mm3 (1.8-7.7)  H  06/14/18  04:25    


 


ESR  32 mm/Hr (0-20)   06/13/18  20:53    


 


PT  15.4 Sec. (12.2-14.9)  H  06/13/18  20:26    


 


INR  1.16  (0.87-1.13)  H  06/13/18  20:26    


 


POC ABG pH  7.460  (7.35-7.45)  H  06/13/18  21:10    


 


POC ABG pCO2  32.3  (35-45)  L  06/13/18  21:10    


 


POC ABG pO2  92  ()   06/13/18  21:10    


 


POC ABG HCO3  23.0   06/13/18  21:10    


 


POC ABG Total CO2  24   06/13/18  21:10    


 


POC ABG O2 Sat  98   06/13/18  21:10    


 


POC ABG Base Excess  -1   06/13/18  21:10    


 


VBG pH  7.387  (7.320-7.420)   06/13/18  20:26    


 


FiO2  21 %  06/13/18  21:10    


 


Sodium  135 mmol/L (137-145)  L  06/16/18  05:23    


 


Potassium  3.0 mmol/L (3.6-5.0)  L  06/16/18  05:23    


 


Chloride  96.2 mmol/L ()  L  06/16/18  05:23    


 


Carbon Dioxide  24 mmol/L (22-30)   06/16/18  05:23    


 


Anion Gap  18 mmol/L  06/16/18  05:23    


 


BUN  4 mg/dL (7-17)  L  06/16/18  05:23    


 


Creatinine  0.5 mg/dL (0.7-1.2)  L  06/16/18  05:23    


 


Estimated GFR  > 60 ml/min  06/16/18  05:23    


 


BUN/Creatinine Ratio  8 %  06/16/18  05:23    


 


Glucose  107 mg/dL ()  H  06/16/18  05:23    


 


POC Glucose  88  ()   06/18/18  06:32    


 


Lactic Acid  1.30 mmol/L (0.7-2.0)   06/13/18  23:41    


 


Calcium  8.3 mg/dL (8.4-10.2)  L  06/16/18  05:23    


 


Magnesium  2.40 mg/dL (1.7-2.3)  H  06/13/18  20:53    


 


Total Bilirubin  0.50 mg/dL (0.1-1.2)   06/13/18  20:26    


 


AST  113 units/L (5-40)  H  06/13/18  20:26    


 


ALT  43 units/L (7-56)   06/13/18  20:26    


 


Alkaline Phosphatase  76 units/L ()   06/13/18  20:26    


 


Total Creatine Kinase  3884 units/L ()  H  06/14/18  13:00    


 


CK-MB (CK-2)  21.0 ng/mL (0.0-4.0)  H  06/14/18  13:00    


 


CK-MB (CK-2) Rel Index  0.5  (0-4)   06/14/18  13:00    


 


Troponin T  < 0.010 ng/mL (0.00-0.029)   06/14/18  13:00    


 


C-Reactive Protein  0.90 mg/dL (0.00-1.30)   06/13/18  20:53    


 


Total Protein  7.3 g/dL (6.3-8.2)   06/13/18  20:26    


 


Albumin  4.0 g/dL (3.9-5)   06/13/18  20:26    


 


Albumin/Globulin Ratio  1.2 %  06/13/18  20:26    


 


Urine Color  Yellow  (Yellow)   06/13/18  20:05    


 


Urine Turbidity  Clear  (Clear)   06/13/18  20:05    


 


Urine pH  5.0  (5.0-7.0)   06/13/18  20:05    


 


Ur Specific Gravity  1.028  (1.003-1.030)   06/13/18  20:05    


 


Urine Protein  >500 mg/dL (Negative)   06/13/18  20:05    


 


Urine Glucose (UA)  50 mg/dL (Negative)   06/13/18  20:05    


 


Urine Ketones  20 mg/dL (Negative)   06/13/18  20:05    


 


Urine Blood  Mod  (Negative)   06/13/18  20:05    


 


Urine Nitrite  Neg  (Negative)   06/13/18  20:05    


 


Urine Bilirubin  Neg  (Negative)   06/13/18  20:05    


 


Urine Urobilinogen  < 2.0 mg/dL (<2.0)   06/13/18  20:05    


 


Ur Leukocyte Esterase  Neg  (Negative)   06/13/18  20:05    


 


Urine WBC (Auto)  8.0 /HPF (0.0-6.0)  H  06/13/18  20:05    


 


Urine RBC (Auto)  3.0 /HPF (0.0-6.0)   06/13/18  20:05    


 


U Epithel Cells (Auto)  1.0 /HPF (0-13.0)   06/13/18  20:05    


 


Urine Bacteria (Auto)  1+ /HPF (Negative)   06/13/18  20:05    


 


Granular Casts  3 /LPF  06/13/18  20:05    


 


Urine Mucus  2+ /HPF  06/13/18  20:05    


 


CSF Appearance  Clear   06/13/18  23:00    


 


CSF Color  Colorless   06/13/18  23:00    


 


CSF WBC  2 /mm3 (1-10)   06/13/18  23:00    


 


CSF RBC  54 /mm3 (0-0)   06/13/18  23:00    


 


CSF Seg Neutrophils  14.3 % (0-6)   06/13/18  23:00    


 


CSF Lymphocytes %  85.7 % (40-80)   06/13/18  23:00    


 


CSF Reactive Lymphs  0 %  06/13/18  23:00    


 


CSF Monocytes %  0 % (15-45)   06/13/18  23:00    


 


CSF Eosinophils %  0 %  06/13/18  23:00    


 


CSF Basophils  0 %  06/13/18  23:00    


 


CSF Comment  Diff performed   06/13/18  23:00    


 


CSF Pathologist Review  C   06/13/18  23:00    


 


CSF Glucose  94 mg/dL  06/13/18  23:00    


 


CSF Total Protein  56 mg/dL  06/13/18  23:00    


 


Salicylates  < 0.3 mg/dL (2.8-20.0)  L  06/13/18  20:53    


 


Acetaminophen  < 5.0 ug/mL (10.0-30.0)  L  06/13/18  20:53    


 


Lithium  0.1 mmol/L (0.0-1.2)   06/13/18  20:53

## 2018-06-18 NOTE — PROGRESS NOTE
Assessment and Plan








Acute encephalopathy.


Systemic inflammatory response syndrome.


Rhabdomyolysis.


Hypernatremia.


Elevated serum transaminases.


History of bipolar disorder.


History of schizophrenia.


Mild metabolic acidosis





- stopped qhs ativan and will avoid benzo's as possible


- stopped free water as hypernatremia resolved


- continue enteral nutrition


- continue swallow evaluation


- continue aspiration precautions


- psychiatry consult placed


- stopped antibiotics and following clinically


- continue GI & VTE prophylaxis


- PT/OT evaluation ongoing





.... 25'











Subjective


Date of service: 06/18/18


Principal diagnosis: Acute Encephalopathy; SIRS; Hypernatremia; Rhabdomyolysis; 

Bipolar Disorder


Interval history: 





Patient is seen today for: Acute Encephalopathy; SIRS; Hypernatremia; 

Rhabdomyolysis; Bipolar Disorder





Seen and examined at bedside; 24hour events reviewed; nursing and respiratory 

care staff consulted; no adverse overnight events reported to me; resting in bed

; seen by psychiatry team and suspicion is more-so for acute delirium over 

active psychosis





Objective


 Vital Signs - 12hr











  06/18/18





  07:30


 


Temperature 98.7 F


 


Pulse Rate 80


 


Respiratory 19





Rate 


 


Blood Pressure 113/72


 


O2 Sat by Pulse 86





Oximetry 











Constitutional: no acute distress, other (middle aged AAF; normocephalic and 

atraumatic in no acute respiratory distress)


Eyes: non-icteric


ENT: oropharynx moist, other (mallampatti II)


Neck: supple, no lymphadenopathy, no JVD, other (no thyromegaly)


Effort: mildly labored


Ascultation: Bilateral: clear


Percussion: Bilateral: not dull


Cardiovascular: regular rate and rhythm, other (no R/M)


Gastrointestinal: normoactive bowel sounds, soft, non-tender, non-distended, 

other (No HSM)


Integumentary: other (left thigh tender indurated 4X6 cm area improving)


Extremities: no cyanosis, no edema, pulses normal, no ischemia or petechiae


Neurologic: non-focal exam, pupils equal and round, CN II-XII normal, motor 

strength normal and


Psychiatric: other (bipolar; schizoid)


CBC and BMP: 


 06/16/18 05:23





 06/16/18 05:23


ABG, PT/INR, D-dimer: 


ABG











POC ABG pH  7.460  (7.35-7.45)  H  06/13/18  21:10    


 


POC ABG pCO2  32.3  (35-45)  L  06/13/18  21:10    


 


POC ABG pO2  92  ()   06/13/18  21:10    


 


POC ABG HCO3  23.0   06/13/18  21:10    


 


POC ABG Total CO2  24   06/13/18  21:10    


 


POC ABG O2 Sat  98   06/13/18  21:10    





PT/INR, D-dimer











PT  15.4 Sec. (12.2-14.9)  H  06/13/18  20:26    


 


INR  1.16  (0.87-1.13)  H  06/13/18  20:26    








Abnormal lab findings: 


 Abnormal Labs











  06/13/18 06/13/18 06/13/18





  20:05 20:26 20:26


 


WBC   17.1 H 


 


RDW   12.9 L 


 


Plt Count   475 H 


 


Lymph % (Auto)   7.7 L 


 


Mono % (Auto)   7.6 H 


 


Mono #   1.3 H 


 


Seg Neutrophils %   84.4 H 


 


Seg Neutrophils #   14.5 H 


 


PT    15.4 H


 


INR    1.16 H


 


POC ABG pH   


 


POC ABG pCO2   


 


Sodium   


 


Potassium   


 


Chloride   


 


Carbon Dioxide   


 


BUN   


 


Creatinine   


 


Glucose   


 


POC Glucose   


 


Lactic Acid   


 


Calcium   


 


Magnesium   


 


AST   


 


Total Creatine Kinase   


 


CK-MB (CK-2)   


 


Urine WBC (Auto)  8.0 H  


 


Salicylates   


 


Acetaminophen   














  06/13/18 06/13/18 06/13/18





  20:26 20:26 20:53


 


WBC   


 


RDW   


 


Plt Count   


 


Lymph % (Auto)   


 


Mono % (Auto)   


 


Mono #   


 


Seg Neutrophils %   


 


Seg Neutrophils #   


 


PT   


 


INR   


 


POC ABG pH   


 


POC ABG pCO2   


 


Sodium  151 H  


 


Potassium   


 


Chloride  112.5 H  


 


Carbon Dioxide  21 L  


 


BUN  37 H  


 


Creatinine   


 


Glucose  159 H  


 


POC Glucose   


 


Lactic Acid   3.10 H* 


 


Calcium   


 


Magnesium    2.40 H


 


AST  113 H  


 


Total Creatine Kinase    5716 H


 


CK-MB (CK-2)   


 


Urine WBC (Auto)   


 


Salicylates   


 


Acetaminophen   














  06/13/18 06/13/18 06/13/18





  20:53 20:53 21:10


 


WBC   


 


RDW   


 


Plt Count   


 


Lymph % (Auto)   


 


Mono % (Auto)   


 


Mono #   


 


Seg Neutrophils %   


 


Seg Neutrophils #   


 


PT   


 


INR   


 


POC ABG pH    7.460 H


 


POC ABG pCO2    32.3 L


 


Sodium   


 


Potassium   


 


Chloride   


 


Carbon Dioxide   


 


BUN   


 


Creatinine   


 


Glucose   


 


POC Glucose   


 


Lactic Acid   


 


Calcium   


 


Magnesium   


 


AST   


 


Total Creatine Kinase   


 


CK-MB (CK-2)   


 


Urine WBC (Auto)   


 


Salicylates  < 0.3 L  


 


Acetaminophen   < 5.0 L 














  06/14/18 06/14/18 06/14/18





  01:39 04:25 04:25


 


WBC   19.6 H 


 


RDW   12.5 L 


 


Plt Count   


 


Lymph % (Auto)   


 


Mono % (Auto)   9.4 H 


 


Jessamine #   1.8 H 


 


Seg Neutrophils %   74.2 H 


 


Seg Neutrophils #   14.5 H 


 


PT   


 


INR   


 


POC ABG pH   


 


POC ABG pCO2   


 


Sodium    154 H


 


Potassium   


 


Chloride    118.0 H


 


Carbon Dioxide    21 L


 


BUN    25 H


 


Creatinine   


 


Glucose    117 H


 


POC Glucose   


 


Lactic Acid   


 


Calcium    8.1 L


 


Magnesium   


 


AST   


 


Total Creatine Kinase  5392 H  


 


CK-MB (CK-2)  40.1 H  


 


Urine WBC (Auto)   


 


Salicylates   


 


Acetaminophen   














  06/14/18 06/14/18 06/14/18





  11:46 13:00 17:02


 


WBC   


 


RDW   


 


Plt Count   


 


Lymph % (Auto)   


 


Mono % (Auto)   


 


Mono #   


 


Seg Neutrophils %   


 


Seg Neutrophils #   


 


PT   


 


INR   


 


POC ABG pH   


 


POC ABG pCO2   


 


Sodium   


 


Potassium   


 


Chloride   


 


Carbon Dioxide   


 


BUN   


 


Creatinine   


 


Glucose   


 


POC Glucose  117 H   106 H


 


Lactic Acid   


 


Calcium   


 


Magnesium   


 


AST   


 


Total Creatine Kinase   3884 H 


 


CK-MB (CK-2)   21.0 H 


 


Urine WBC (Auto)   


 


Salicylates   


 


Acetaminophen   














  06/15/18 06/15/18 06/15/18





  05:40 15:32 21:26


 


WBC   


 


RDW   


 


Plt Count   


 


Lymph % (Auto)   


 


Mono % (Auto)   


 


Mono #   


 


Seg Neutrophils %   


 


Seg Neutrophils #   


 


PT   


 


INR   


 


POC ABG pH   


 


POC ABG pCO2   


 


Sodium   


 


Potassium   3.4 L 


 


Chloride   


 


Carbon Dioxide   


 


BUN   5 L 


 


Creatinine   0.6 L 


 


Glucose   


 


POC Glucose  110 H   116 H


 


Lactic Acid   


 


Calcium   


 


Magnesium   


 


AST   


 


Total Creatine Kinase   


 


CK-MB (CK-2)   


 


Urine WBC (Auto)   


 


Salicylates   


 


Acetaminophen   














  06/16/18 06/16/18 06/16/18





  05:23 05:23 12:17


 


WBC  12.2 H  


 


RDW  12.5 L  


 


Plt Count   


 


Lymph % (Auto)   


 


Mono % (Auto)   


 


Mono #   


 


Seg Neutrophils %   


 


Seg Neutrophils #   


 


PT   


 


INR   


 


POC ABG pH   


 


POC ABG pCO2   


 


Sodium   135 L 


 


Potassium   3.0 L 


 


Chloride   96.2 L 


 


Carbon Dioxide   


 


BUN   4 L 


 


Creatinine   0.5 L 


 


Glucose   107 H 


 


POC Glucose    136 H


 


Lactic Acid   


 


Calcium   8.3 L 


 


Magnesium   


 


AST   


 


Total Creatine Kinase   


 


CK-MB (CK-2)   


 


Urine WBC (Auto)   


 


Salicylates   


 


Acetaminophen   














  06/17/18 06/17/18 06/18/18





  00:00 16:22 03:23


 


WBC   


 


RDW   


 


Plt Count   


 


Lymph % (Auto)   


 


Mono % (Auto)   


 


Mono #   


 


Seg Neutrophils %   


 


Seg Neutrophils #   


 


PT   


 


INR   


 


POC ABG pH   


 


POC ABG pCO2   


 


Sodium   


 


Potassium   


 


Chloride   


 


Carbon Dioxide   


 


BUN   


 


Creatinine   


 


Glucose   


 


POC Glucose  112 H  123 H  126 H


 


Lactic Acid   


 


Calcium   


 


Magnesium   


 


AST   


 


Total Creatine Kinase   


 


CK-MB (CK-2)   


 


Urine WBC (Auto)   


 


Salicylates   


 


Acetaminophen   











Allied health notes reviewed: nursing

## 2018-06-19 LAB
BUN SERPL-MCNC: 5 MG/DL (ref 7–17)
BUN/CREAT SERPL: 10 %
CALCIUM SERPL-MCNC: 8.7 MG/DL (ref 8.4–10.2)
HEMOLYSIS INDEX: 1

## 2018-06-19 RX ADMIN — Medication SCH ML: at 10:01

## 2018-06-19 RX ADMIN — ENOXAPARIN SODIUM SCH MG: 100 INJECTION SUBCUTANEOUS at 09:59

## 2018-06-19 RX ADMIN — DEXTROSE SCH MLS/HR: 5 SOLUTION INTRAVENOUS at 17:54

## 2018-06-19 RX ADMIN — DEXTROSE SCH MLS/HR: 5 SOLUTION INTRAVENOUS at 09:54

## 2018-06-19 RX ADMIN — DEXTROSE SCH MLS/HR: 5 SOLUTION INTRAVENOUS at 01:49

## 2018-06-19 RX ADMIN — Medication SCH ML: at 21:57

## 2018-06-19 NOTE — PROGRESS NOTE
Subjective





- Reason for Consult


Consult date: 06/19/18


Reason for consult: Psychiatry Follow-up





- Chief Complaint


Chief complaint: 


"AMS"





53-year-old female seen on the medical floor. She was brought to the hospital 

by ambulance for altered mental status. She lives in [facility] since April, 2018. Today the patient is calm, but still confused during 

the assessment. Per the patient's assigned RN, she stated that another staff 

member may have witnessed a seizure by the patient. This information was 

forwarded to the patient's assigned Hospitalist. Per the staff, no behavioral 

disturbances overnight by the patient. 








Mental Status Exam





- Vital signs


 Last Vital Signs











Temp  97.8 F   06/19/18 08:05


 


Pulse  118 H  06/19/18 09:39


 


Resp  16   06/19/18 08:05


 


BP  128/75   06/19/18 09:39


 


Pulse Ox  98   06/19/18 09:39














- Exam


Narrative exam: 


The MSE could not be completed at this time because of the patient's condition. 











Assessment and Plan


Impression: Schizophrenia per history. Today the patient is calm, but confused 

during the assessment. The patient isn't in restraints, but have on mittens. 





Medical diagnoses: Acute Encephalopathy, SIRS, Rhabdomyolysis, and 

Hypernatremia.





Recommendations/Plan: Continue Risperdal 1 mg PO BID and Ativan 0.5 mg PO HS as 

ordered by the medical provider. Treat underlying medical conditions. Psych 

will monitor mental status daily. The patient can follow up at [facility] 

once discharged per her daughter Therese Mesa. Recommend Delirium 

precautions below:





1. Frequently reorient patient and involve him/her in their care (simple 

explanations of procedures, tests, medications).


2. Lights on and shades open during daytime hours.


3. Write date and goals of care in a visible place.


4. Try to avoid unnecessary interruptions to sleep during nighttime hours.


5. Obtain glasses, hearing aids from home if patient uses these at baseline.


6. Avoid medications that may exacerbate delirium (especially narcotics, 

benzodiazepines, barbiturates, ambien, lunesta, and medications with excessive 

anticholinergic properties). Ativan 0.5 mg PO HS is ordered by the medical 

staff. Continuing the current regimen is recommended since she is reported to 

be improving, it's a home medication, and to prevent benzo withdrawals.

## 2018-06-19 NOTE — PROGRESS NOTE
Assessment and Plan


Assessment and plan: 


Per HPI documentation


53 year old woman with bipolar, schizophrenia was brought to the ER for 

evaluation of altered mental status. No further history is available. A lumbar 

puncture was done and the patient was treat empirically for meningitis and 

possible NMS, but discontinued after serology did not prove to show meningitis.

  Discussion with the family member reported that patient has been in group 

home due to schizophrenia and bipolar disorder.  She was seen by neurology no 

evidence of seizure was noted.  Symptoms has improved.  Today she passed a 

swallowing eval.  Although still a little bit weak we'll monitor her for a 24-

hour significant to this improved, discharged in the morning.  








?SEIZURE THIS AM


* EEG


* MRI


Acute Encephalopathy Unkown Etiology


* CT brain negative.


* R/O Serotonin syndrome-Less likely


* Neurology and Pysch consultation-input noted. EEG negative. 


* lumber puncture serology AND crypto culture negative for Bacterial Meningitis


Acute cystitis


 * Await cultures-no growth.  Treated with Levaquin and complete exam


SIRS


* POA, Leukocytosis, Tachycardia,


* Continue vancomycin and zosyn D#6-was changed to Levaquin At discharge 

without antibiotics.


* Cxr is negative for acute disease


* s/p one dose Acyclovir. 


Hypernatermia


* Improved, stop D5 once tolerating diet.Continue Risperdal 1 mg PO BID and 

Ativan 0.5 mg PO HS as ordered by the medical provider. Treat underlying 

medical conditions.The patient can follow up at [facility] once discharged 

per her daughter Therese Mes[place]. 


Rhabdomlysis


* Result following the fluids


Bipolar disorder


* Restart home meds as noted above


* Psychiatric bruits noted


Group Home Resident


* Will return on discharge


Schizophrenia


* On Respiradone and ativan on MED REC


* Resume PRN ativan to prevent withdrawal


FEN


* Continue tube feed


* Aspiration Precautions.


DVT/GI prophy


No family present


Discharge in a.m. back to group home.























History


Interval history: 


Patient seen and examined, nursing staff today reports witnessed seizure 

episode. Patient remains confused, staring in space and then began having 

conversation





Hospitalist Physical





- Physical exam


Narrative exam: 


Gen. appearance: Patient lying in bed, no apparent distress


HEENT: Normocephalic, atraumatic, pupils equally round and reactive to light, 

pinpoint, unable to do extraocular movement, and no sclericterus,. No JVD or 

thyromegaly or nodule,neck supple, no carotid bruit ,mucous membranes moist, no 

exudate or erythema


Heart: S1, S2, regular rate and rhythm


Lungs: Clear anteriorly bilaterally, breathing comfortable


Abdomen: Positive bowel sounds, soft, nondistended, no organomegaly


Extremity:no edema,  cyanosis, clubbing


Neuro: Lathergic











- Constitutional


Vitals: 


 











Temp Pulse Resp BP Pulse Ox


 


 97.8 F   118 H  16   128/75   98 


 


 06/19/18 08:05  06/19/18 09:39  06/19/18 08:05  06/19/18 09:39  06/19/18 09:39














Results





- Labs


CBC & Chem 7: 


 06/16/18 05:23





 06/19/18 05:50


Labs: 


 Laboratory Last Values











WBC  12.2 K/mm3 (4.5-11.0)  H  06/16/18  05:23    


 


RBC  3.96 M/mm3 (3.65-5.03)   06/16/18  05:23    


 


Hgb  12.2 gm/dl (10.1-14.3)   06/16/18  05:23    


 


Hct  36.0 % (30.3-42.9)   06/16/18  05:23    


 


MCV  91 fl (79-97)   06/16/18  05:23    


 


MCH  31 pg (28-32)   06/16/18  05:23    


 


MCHC  34 % (30-34)   06/16/18  05:23    


 


RDW  12.5 % (13.2-15.2)  L  06/16/18  05:23    


 


Plt Count  397 K/mm3 (140-440)   06/16/18  05:23    


 


Lymph % (Auto)  15.9 % (13.4-35.0)   06/14/18  04:25    


 


Mono % (Auto)  9.4 % (0.0-7.3)  H  06/14/18  04:25    


 


Eos % (Auto)  0.1 % (0.0-4.3)   06/14/18  04:25    


 


Baso % (Auto)  0.4 % (0.0-1.8)   06/14/18  04:25    


 


Lymph #  3.1 K/mm3 (1.2-5.4)   06/14/18  04:25    


 


Mono #  1.8 K/mm3 (0.0-0.8)  H  06/14/18  04:25    


 


Eos #  0.0 K/mm3 (0.0-0.4)   06/14/18  04:25    


 


Baso #  0.1 K/mm3 (0.0-0.1)   06/14/18  04:25    


 


Seg Neutrophils %  74.2 % (40.0-70.0)  H  06/14/18  04:25    


 


Seg Neutrophils #  14.5 K/mm3 (1.8-7.7)  H  06/14/18  04:25    


 


ESR  32 mm/Hr (0-20)   06/13/18  20:53    


 


PT  15.4 Sec. (12.2-14.9)  H  06/13/18  20:26    


 


INR  1.16  (0.87-1.13)  H  06/13/18  20:26    


 


POC ABG pH  7.460  (7.35-7.45)  H  06/13/18  21:10    


 


POC ABG pCO2  32.3  (35-45)  L  06/13/18  21:10    


 


POC ABG pO2  92  ()   06/13/18  21:10    


 


POC ABG HCO3  23.0   06/13/18  21:10    


 


POC ABG Total CO2  24   06/13/18  21:10    


 


POC ABG O2 Sat  98   06/13/18  21:10    


 


POC ABG Base Excess  -1   06/13/18  21:10    


 


VBG pH  7.387  (7.320-7.420)   06/13/18  20:26    


 


FiO2  21 %  06/13/18  21:10    


 


Sodium  138 mmol/L (137-145)   06/19/18  05:50    


 


Potassium  3.0 mmol/L (3.6-5.0)  L  06/19/18  05:50    


 


Chloride  94.4 mmol/L ()  L  06/19/18  05:50    


 


Carbon Dioxide  32 mmol/L (22-30)  H D 06/19/18  05:50    


 


Anion Gap  15 mmol/L  06/19/18  05:50    


 


BUN  5 mg/dL (7-17)  L  06/19/18  05:50    


 


Creatinine  0.5 mg/dL (0.7-1.2)  L  06/19/18  05:50    


 


Estimated GFR  > 60 ml/min  06/19/18  05:50    


 


BUN/Creatinine Ratio  10 %  06/19/18  05:50    


 


Glucose  111 mg/dL ()  H  06/19/18  05:50    


 


POC Glucose  130  ()  H  06/19/18  07:00    


 


Lactic Acid  1.30 mmol/L (0.7-2.0)   06/13/18  23:41    


 


Calcium  8.7 mg/dL (8.4-10.2)   06/19/18  05:50    


 


Magnesium  2.40 mg/dL (1.7-2.3)  H  06/13/18  20:53    


 


Total Bilirubin  0.50 mg/dL (0.1-1.2)   06/13/18  20:26    


 


AST  113 units/L (5-40)  H  06/13/18  20:26    


 


ALT  43 units/L (7-56)   06/13/18  20:26    


 


Alkaline Phosphatase  76 units/L ()   06/13/18  20:26    


 


Total Creatine Kinase  451 units/L ()  H  06/18/18  14:58    


 


CK-MB (CK-2)  21.0 ng/mL (0.0-4.0)  H  06/14/18  13:00    


 


CK-MB (CK-2) Rel Index  0.5  (0-4)   06/14/18  13:00    


 


Troponin T  < 0.010 ng/mL (0.00-0.029)   06/14/18  13:00    


 


C-Reactive Protein  0.90 mg/dL (0.00-1.30)   06/13/18  20:53    


 


Total Protein  7.3 g/dL (6.3-8.2)   06/13/18  20:26    


 


Albumin  4.0 g/dL (3.9-5)   06/13/18  20:26    


 


Albumin/Globulin Ratio  1.2 %  06/13/18  20:26    


 


Urine Color  Yellow  (Yellow)   06/13/18  20:05    


 


Urine Turbidity  Clear  (Clear)   06/13/18  20:05    


 


Urine pH  5.0  (5.0-7.0)   06/13/18  20:05    


 


Ur Specific Gravity  1.028  (1.003-1.030)   06/13/18  20:05    


 


Urine Protein  >500 mg/dL (Negative)   06/13/18  20:05    


 


Urine Glucose (UA)  50 mg/dL (Negative)   06/13/18  20:05    


 


Urine Ketones  20 mg/dL (Negative)   06/13/18  20:05    


 


Urine Blood  Mod  (Negative)   06/13/18  20:05    


 


Urine Nitrite  Neg  (Negative)   06/13/18  20:05    


 


Urine Bilirubin  Neg  (Negative)   06/13/18  20:05    


 


Urine Urobilinogen  < 2.0 mg/dL (<2.0)   06/13/18  20:05    


 


Ur Leukocyte Esterase  Neg  (Negative)   06/13/18  20:05    


 


Urine WBC (Auto)  8.0 /HPF (0.0-6.0)  H  06/13/18  20:05    


 


Urine RBC (Auto)  3.0 /HPF (0.0-6.0)   06/13/18  20:05    


 


U Epithel Cells (Auto)  1.0 /HPF (0-13.0)   06/13/18  20:05    


 


Urine Bacteria (Auto)  1+ /HPF (Negative)   06/13/18  20:05    


 


Granular Casts  3 /LPF  06/13/18  20:05    


 


Urine Mucus  2+ /HPF  06/13/18  20:05    


 


CSF Appearance  Clear   06/13/18  23:00    


 


CSF Color  Colorless   06/13/18  23:00    


 


CSF WBC  2 /mm3 (1-10)   06/13/18  23:00    


 


CSF RBC  54 /mm3 (0-0)   06/13/18  23:00    


 


CSF Seg Neutrophils  14.3 % (0-6)   06/13/18  23:00    


 


CSF Lymphocytes %  85.7 % (40-80)   06/13/18  23:00    


 


CSF Reactive Lymphs  0 %  06/13/18  23:00    


 


CSF Monocytes %  0 % (15-45)   06/13/18  23:00    


 


CSF Eosinophils %  0 %  06/13/18  23:00    


 


CSF Basophils  0 %  06/13/18  23:00    


 


CSF Comment  Diff performed   06/13/18  23:00    


 


CSF Pathologist Review  C   06/13/18  23:00    


 


CSF Glucose  94 mg/dL  06/13/18  23:00    


 


CSF Total Protein  56 mg/dL  06/13/18  23:00    


 


Salicylates  < 0.3 mg/dL (2.8-20.0)  L  06/13/18  20:53    


 


Acetaminophen  < 5.0 ug/mL (10.0-30.0)  L  06/13/18  20:53    


 


Lithium  0.1 mmol/L (0.0-1.2)   06/13/18  20:53

## 2018-06-19 NOTE — PROGRESS NOTE
Subjective


Date of service: 06/19/18


Principal diagnosis: Acute Encephalopathy; SIRS; Hypernatremia; Rhabdomyolysis; 

Bipolar Disorder





Objective


 Vital Signs - 12hr











  06/19/18 06/19/18 06/19/18





  08:05 09:39 15:47


 


Temperature 97.8 F  97.8 F


 


Pulse Rate  118 H 


 


Respiratory 16  16





Rate   


 


Blood Pressure 135/94 128/75 105/72


 


O2 Sat by Pulse  98 





Oximetry   











Constitutional: no acute distress, other (middle aged AAF; normocephalic and 

atraumatic in no acute respiratory distress)


Eyes: non-icteric


ENT: oropharynx moist, other (mallampatti II)


Neck: supple, no lymphadenopathy, no JVD, other (no thyromegaly)


Effort: mildly labored


Ascultation: Bilateral: clear


Percussion: Bilateral: not dull


Cardiovascular: regular rate and rhythm, other (no R/M)


Gastrointestinal: normoactive bowel sounds, soft, non-tender, non-distended, 

other (No HSM)


Integumentary: other (left thigh tender indurated 4X6 cm area improving)


Extremities: no cyanosis, no edema, pulses normal, no ischemia or petechiae


Neurologic: non-focal exam, pupils equal and round, CN II-XII normal, motor 

strength normal and


Psychiatric: other (bipolar; schizoid)


CBC and BMP: 


 06/16/18 05:23





 06/19/18 05:50


ABG, PT/INR, D-dimer: 


ABG











POC ABG pH  7.460  (7.35-7.45)  H  06/13/18  21:10    


 


POC ABG pCO2  32.3  (35-45)  L  06/13/18  21:10    


 


POC ABG pO2  92  ()   06/13/18  21:10    


 


POC ABG HCO3  23.0   06/13/18  21:10    


 


POC ABG Total CO2  24   06/13/18  21:10    


 


POC ABG O2 Sat  98   06/13/18  21:10    





PT/INR, D-dimer











PT  15.4 Sec. (12.2-14.9)  H  06/13/18  20:26    


 


INR  1.16  (0.87-1.13)  H  06/13/18  20:26    








Abnormal lab findings: 


 Abnormal Labs











  06/13/18 06/13/18 06/13/18





  20:05 20:26 20:26


 


WBC   17.1 H 


 


RDW   12.9 L 


 


Plt Count   475 H 


 


Lymph % (Auto)   7.7 L 


 


Mono % (Auto)   7.6 H 


 


Mono #   1.3 H 


 


Seg Neutrophils %   84.4 H 


 


Seg Neutrophils #   14.5 H 


 


PT    15.4 H


 


INR    1.16 H


 


POC ABG pH   


 


POC ABG pCO2   


 


Sodium   


 


Potassium   


 


Chloride   


 


Carbon Dioxide   


 


BUN   


 


Creatinine   


 


Glucose   


 


POC Glucose   


 


Lactic Acid   


 


Calcium   


 


Magnesium   


 


AST   


 


Total Creatine Kinase   


 


CK-MB (CK-2)   


 


Urine WBC (Auto)  8.0 H  


 


Salicylates   


 


Acetaminophen   














  06/13/18 06/13/18 06/13/18





  20:26 20:26 20:53


 


WBC   


 


RDW   


 


Plt Count   


 


Lymph % (Auto)   


 


Mono % (Auto)   


 


Mono #   


 


Seg Neutrophils %   


 


Seg Neutrophils #   


 


PT   


 


INR   


 


POC ABG pH   


 


POC ABG pCO2   


 


Sodium  151 H  


 


Potassium   


 


Chloride  112.5 H  


 


Carbon Dioxide  21 L  


 


BUN  37 H  


 


Creatinine   


 


Glucose  159 H  


 


POC Glucose   


 


Lactic Acid   3.10 H* 


 


Calcium   


 


Magnesium    2.40 H


 


AST  113 H  


 


Total Creatine Kinase    5716 H


 


CK-MB (CK-2)   


 


Urine WBC (Auto)   


 


Salicylates   


 


Acetaminophen   














  06/13/18 06/13/18 06/13/18





  20:53 20:53 21:10


 


WBC   


 


RDW   


 


Plt Count   


 


Lymph % (Auto)   


 


Mono % (Auto)   


 


Mono #   


 


Seg Neutrophils %   


 


Seg Neutrophils #   


 


PT   


 


INR   


 


POC ABG pH    7.460 H


 


POC ABG pCO2    32.3 L


 


Sodium   


 


Potassium   


 


Chloride   


 


Carbon Dioxide   


 


BUN   


 


Creatinine   


 


Glucose   


 


POC Glucose   


 


Lactic Acid   


 


Calcium   


 


Magnesium   


 


AST   


 


Total Creatine Kinase   


 


CK-MB (CK-2)   


 


Urine WBC (Auto)   


 


Salicylates  < 0.3 L  


 


Acetaminophen   < 5.0 L 














  06/14/18 06/14/18 06/14/18





  01:39 04:25 04:25


 


WBC   19.6 H 


 


RDW   12.5 L 


 


Plt Count   


 


Lymph % (Auto)   


 


Mono % (Auto)   9.4 H 


 


[place] #   1.8 H 


 


Seg Neutrophils %   74.2 H 


 


Seg Neutrophils #   14.5 H 


 


PT   


 


INR   


 


POC ABG pH   


 


POC ABG pCO2   


 


Sodium    154 H


 


Potassium   


 


Chloride    118.0 H


 


Carbon Dioxide    21 L


 


BUN    25 H


 


Creatinine   


 


Glucose    117 H


 


POC Glucose   


 


Lactic Acid   


 


Calcium    8.1 L


 


Magnesium   


 


AST   


 


Total Creatine Kinase  5392 H  


 


CK-MB (CK-2)  40.1 H  


 


Urine WBC (Auto)   


 


Salicylates   


 


Acetaminophen   














  06/14/18 06/14/18 06/14/18





  11:46 13:00 17:02


 


WBC   


 


RDW   


 


Plt Count   


 


Lymph % (Auto)   


 


Mono % (Auto)   


 


Mono #   


 


Seg Neutrophils %   


 


Seg Neutrophils #   


 


PT   


 


INR   


 


POC ABG pH   


 


POC ABG pCO2   


 


Sodium   


 


Potassium   


 


Chloride   


 


Carbon Dioxide   


 


BUN   


 


Creatinine   


 


Glucose   


 


POC Glucose  117 H   106 H


 


Lactic Acid   


 


Calcium   


 


Magnesium   


 


AST   


 


Total Creatine Kinase   3884 H 


 


CK-MB (CK-2)   21.0 H 


 


Urine WBC (Auto)   


 


Salicylates   


 


Acetaminophen   














  06/15/18 06/15/18 06/15/18





  05:40 15:32 21:26


 


WBC   


 


RDW   


 


Plt Count   


 


Lymph % (Auto)   


 


Mono % (Auto)   


 


Mono #   


 


Seg Neutrophils %   


 


Seg Neutrophils #   


 


PT   


 


INR   


 


POC ABG pH   


 


POC ABG pCO2   


 


Sodium   


 


Potassium   3.4 L 


 


Chloride   


 


Carbon Dioxide   


 


BUN   5 L 


 


Creatinine   0.6 L 


 


Glucose   


 


POC Glucose  110 H   116 H


 


Lactic Acid   


 


Calcium   


 


Magnesium   


 


AST   


 


Total Creatine Kinase   


 


CK-MB (CK-2)   


 


Urine WBC (Auto)   


 


Salicylates   


 


Acetaminophen   














  06/16/18 06/16/18 06/16/18





  05:23 05:23 12:17


 


WBC  12.2 H  


 


RDW  12.5 L  


 


Plt Count   


 


Lymph % (Auto)   


 


Mono % (Auto)   


 


Mono #   


 


Seg Neutrophils %   


 


Seg Neutrophils #   


 


PT   


 


INR   


 


POC ABG pH   


 


POC ABG pCO2   


 


Sodium   135 L 


 


Potassium   3.0 L 


 


Chloride   96.2 L 


 


Carbon Dioxide   


 


BUN   4 L 


 


Creatinine   0.5 L 


 


Glucose   107 H 


 


POC Glucose    136 H


 


Lactic Acid   


 


Calcium   8.3 L 


 


Magnesium   


 


AST   


 


Total Creatine Kinase   


 


CK-MB (CK-2)   


 


Urine WBC (Auto)   


 


Salicylates   


 


Acetaminophen   














  06/17/18 06/17/18 06/18/18





  00:00 16:22 03:23


 


WBC   


 


RDW   


 


Plt Count   


 


Lymph % (Auto)   


 


Mono % (Auto)   


 


Mono #   


 


Seg Neutrophils %   


 


Seg Neutrophils #   


 


PT   


 


INR   


 


POC ABG pH   


 


POC ABG pCO2   


 


Sodium   


 


Potassium   


 


Chloride   


 


Carbon Dioxide   


 


BUN   


 


Creatinine   


 


Glucose   


 


POC Glucose  112 H  123 H  126 H


 


Lactic Acid   


 


Calcium   


 


Magnesium   


 


AST   


 


Total Creatine Kinase   


 


CK-MB (CK-2)   


 


Urine WBC (Auto)   


 


Salicylates   


 


Acetaminophen   














  06/18/18 06/18/18 06/19/18





  14:58 21:36 05:50


 


WBC   


 


RDW   


 


Plt Count   


 


Lymph % (Auto)   


 


Mono % (Auto)   


 


Mono #   


 


Seg Neutrophils %   


 


Seg Neutrophils #   


 


PT   


 


INR   


 


POC ABG pH   


 


POC ABG pCO2   


 


Sodium   


 


Potassium    3.0 L


 


Chloride    94.4 L


 


Carbon Dioxide    32 H D


 


BUN    5 L


 


Creatinine    0.5 L


 


Glucose    111 H


 


POC Glucose   114 H 


 


Lactic Acid   


 


Calcium   


 


Magnesium   


 


AST   


 


Total Creatine Kinase  451 H  


 


CK-MB (CK-2)   


 


Urine WBC (Auto)   


 


Salicylates   


 


Acetaminophen   














  06/19/18 06/19/18





  07:00 16:34


 


WBC  


 


RDW  


 


Plt Count  


 


Lymph % (Auto)  


 


Mono % (Auto)  


 


Mono #  


 


Seg Neutrophils %  


 


Seg Neutrophils #  


 


PT  


 


INR  


 


POC ABG pH  


 


POC ABG pCO2  


 


Sodium  


 


Potassium  


 


Chloride  


 


Carbon Dioxide  


 


BUN  


 


Creatinine  


 


Glucose  


 


POC Glucose  130 H  114 H


 


Lactic Acid  


 


Calcium  


 


Magnesium  


 


AST  


 


Total Creatine Kinase  


 


CK-MB (CK-2)  


 


Urine WBC (Auto)  


 


Salicylates  


 


Acetaminophen  











Allied health notes reviewed: nursing

## 2018-06-20 VITALS — SYSTOLIC BLOOD PRESSURE: 119 MMHG | DIASTOLIC BLOOD PRESSURE: 83 MMHG

## 2018-06-20 RX ADMIN — DEXTROSE SCH MLS/HR: 5 SOLUTION INTRAVENOUS at 10:04

## 2018-06-20 RX ADMIN — DEXTROSE SCH MLS/HR: 5 SOLUTION INTRAVENOUS at 02:20

## 2018-06-20 RX ADMIN — ENOXAPARIN SODIUM SCH MG: 100 INJECTION SUBCUTANEOUS at 10:04

## 2018-06-20 NOTE — DISCHARGE SUMMARY
Providers





- Providers


Date of Admission: 


06/13/18 23:48





Attending physician: 


LAUREN RIVERS MD





 





06/13/18 23:26


Consult to Physician [CONS] Urgent 


   Comment: 


   Consulting Provider: KARLA LEAL


   Physician Instructions: 


   Reason For Exam: nms





06/14/18 09:36


Consult to Physician [CONS] Routine 


   Comment: 


   Consulting Provider: ARMIN FERRERA


   Physician Instructions: 


   Reason For Exam: AMS


Consult to Physician [CONS] Routine 


   Comment: 


   Consulting Provider: GERSON DAS


   Physician Instructions: 


   Reason For Exam: AMS, SCHIZOPHERNIA





06/15/18 11:55


Consult to Dietitian/Nutrition [CONS] Routine 


   Physician Instructions: 


   Reason For Exam: 


   Reason for Consult: Write/Manage Tube Feeding





06/17/18 06:59


Consult to Mental Health [CONS] Routine 


   Reason For Exam: schizohrenia


   Place consult to:: mental health


   Notified:: 


   Phone number called:: [phone]


   Was contact made?: Yes


   If yes, spoke with:: jake


   Time called:: 08:20





06/18/18 11:55


Speech Therapy Evaluation and Treat [CONS] Routine 


   Reason For Exam: swallow eval











Primary care physician: 


PRIMARY CARE MD








Hospitalization


Reason for admission: AMS


Condition: Stable


Hospital course: 


53 year old woman with bipolar, schizophrenia was brought to the ER for 

evaluation of altered mental status. No further history is available. A lumbar 

puncture was done and the patient was treat empirically for meningitis and 

possible NMS, but discontinued after serology did not prove to show meningitis.

  Discussion with the family member reported that patient has been in group 

home due to schizophrenia and bipolar disorder.  She was seen by neurology no 

evidence of seizure was noted.  Symptoms has improved.  Today she passed a 

swallowing eval. patient continued to improve was seen by neurology as noted 

above.  It appeared that there may have been another seizure while monitoring 

the patient EEG again was normal.  The second event of seizure may be secondary 

to benzo withdrawal as patient had not been getting the benzodiazepine.  She 

improved back to her baseline level at times nonverbal at times which I believe 

is secondary to her schizophrenia.  She was seen by pulmonologist she was 

treated with antibiotics and currently is improved.  Psychiatry did see her but 

some adjustments to her psych medications.  Recommended the patient follows up 

at the clinic at [place] on discharge and this was discussed daughter 

Therese Hernadez.








Discharge diagnosis


Seizure


Acute Encephalopathy Unkown Etiology


Acute cystitis


SIRS


Hypernatermia


Rhabdomlysis


Bipolar disorder


Group Home Resident


Undifferentiated Schizophrenia











Disposition: DC/[place]-[facility]


Time spent for discharge: 35 MINS





Core Measure Documentation





- Palliative Care


Palliative Care/ Comfort Measures: Not Applicable





- Core Measures


Any of the following diagnoses?: none





- VTE Discharge Requirements


Deep Vein Thrombosis/Pulmonary Embolism Present on Admission: No





Exam





- Physical Exam


Narrative exam: 


Gen. appearance: Patient lying in bed, no apparent distress


HEENT: Normocephalic, atraumatic, pupils equally round and reactive to light, 

pinpoint, unable to do extraocular movement, and no sclericterus,. No JVD or 

thyromegaly or nodule,neck supple, no carotid bruit ,mucous membranes moist, no 

exudate or erythema


Heart: S1, S2, regular rate and rhythm


Lungs: Clear anteriorly bilaterally, breathing comfortable


Abdomen: Positive bowel sounds, soft, nondistended, no organomegaly


Extremity:no edema,  cyanosis, clubbing


Neuro: Lathergic











- Constitutional


Vitals: 


 











Temp Pulse Resp BP Pulse Ox


 


 98.0 F   70   16   119/83   97 


 


 06/20/18 07:41  06/20/18 00:00  06/20/18 07:41  06/20/18 07:41  06/20/18 00:00














Plan


Activity: advance as tolerated, no driving until cleared by PCP, fall 

precautions


Diet: advance as tolerated


Follow up with: 


PRIMARY CARE,MD [Primary Care Provider] - 3-5 Days


Prescriptions: 


LORazepam [Ativan] 0.5 mg PO QHS #30 tab


risperiDONE [RisperDAL] 1 mg PO BID #60 tablet

## 2018-06-20 NOTE — PROGRESS NOTE
Subjective





- Reason for Consult


Consult date: 06/20/18


Reason for consult: Psychiatric Follow-up Evaluation





- Chief Complaint


Chief complaint: 


"AMS"





Patient is a 53-year-old female seen on the medical floor. She was brought to 

the hospital by ambulance for altered mental status. She lives in [facility] since April, 2018. Today the patient is calm, but still 

confused during the assessment. Patient is selectively mute. Nonverbal. She 

only stares at provider. Answer questions inappropriately. Patient later seen 

talking to self. Per the staff, no behavioral disturbances overnight by the 

patient. No agitation noted/reported. 








Mental Status Exam





- Vital signs


 Last Vital Signs











Temp  98.0 F   06/20/18 07:41


 


Pulse  70   06/20/18 00:00


 


Resp  16   06/20/18 07:41


 


BP  119/83   06/20/18 07:41


 


Pulse Ox  97   06/20/18 00:00














- Exam


Narrative exam: 


The MSE could not be completed at this time because of the patient's condition. 








Assessment and Plan


Impression: Schizophrenia per history. Today the patient is calm, but confused 

during the assessment. The patient isn't in restraints, but have on mittens. 

Patient does not respond appropriately to questions. Patient stares at 

provider. 





Medical diagnoses: Acute Encephalopathy, SIRS, Rhabdomyolysis, and 

Hypernatremia.





Recommendations/Plan: Continue 


1. Continue Risperdal 1 mg PO BID and Ativan 0.5 mg PO HS as ordered by the 

medical provider. 


2. Treat underlying medical conditions. 


3. Psych will monitor mental status daily. 


4. The patient can follow up at [facility] once discharged per her 

daughter Therese Mesa. 








Recommend Delirium precautions below:





1. Frequently reorient patient and involve him/her in their care (simple 

explanations of procedures, tests, medications).


2. Lights on and shades open during daytime hours.


3. Write date and goals of care in a visible place.


4. Try to avoid unnecessary interruptions to sleep during nighttime hours.


5. Obtain glasses, hearing aids from home if patient uses these at baseline.


6. Avoid medications that may exacerbate delirium (especially narcotics, 

benzodiazepines, barbiturates, ambien, lunesta, and medications with excessive 

anticholinergic properties). Ativan 0.5 mg PO HS is ordered by the medical 

staff. Continuing the current regimen is recommended since she is reported to 

be improving, it's a home medication, and to prevent benzo withdrawals.

## 2020-07-02 NOTE — PROGRESS NOTE
Assessment and Plan








Acute encephalopathy.


Systemic inflammatory response syndrome.


Rhabdomyolysis.


Hypernatremia.


Elevated serum transaminases.


History of bipolar disorder.


History of schizophrenia.


Mild metabolic acidosis





- encephalopathy improved


- stopped free water as hypernatremia resolved


- continue enteral nutrition


- continue swallow evaluation


- continue aspiration precautions


- psychiatry consult placed


- stopped antibiotics and following clinically


- continue GI & VTE prophylaxis


- PT/OT evaluation ongoing





.... 25'








Subjective


Date of service: 06/17/18


Principal diagnosis: Acute Encephalopathy; SIRS; Hypernatremia; Rhabdomyolysis; 

Bipolar Disorder


Interval history: 





Patient is seen today for: Acute Encephalopathy; SIRS; Hypernatremia; 

Rhabdomyolysis; Bipolar Disorder





Seen and examined at bedside; 24hour events reviewed; nursing and respiratory 

care staff consulted; no adverse overnight events reported to me; resting in bed

; more alert but hallucinating; tolerating tube feds otherwise





Objective


 Vital Signs - 12hr











  06/17/18 06/17/18





  07:22 08:30


 


Temperature 97.7 F 


 


Pulse Rate 91 H 


 


Respiratory 19 





Rate  


 


Blood Pressure 118/79 


 


O2 Sat by Pulse 100 94





Oximetry  











Constitutional: no acute distress, other (middle aged AAF; normocephalic and 

atraumatic in no acute respiratory distress)


Eyes: non-icteric


ENT: oropharynx moist, other (mallampatti II)


Neck: supple, no lymphadenopathy, no JVD, other (no thyromegaly)


Effort: mildly labored


Ascultation: Bilateral: clear


Percussion: Bilateral: not dull


Cardiovascular: regular rate and rhythm, other (no R/M)


Gastrointestinal: normoactive bowel sounds, soft, non-tender, non-distended, 

other (No HSM)


Integumentary: other (left thigh tender indurated 4X6 cm area improving)


Extremities: no cyanosis, no edema, pulses normal, no ischemia or petechiae


Neurologic: non-focal exam, pupils equal and round, CN II-XII normal, motor 

strength normal and


Psychiatric: other (bipolar; schizoid)


CBC and BMP: 


 06/16/18 05:23





 06/16/18 05:23


ABG, PT/INR, D-dimer: 


ABG











POC ABG pH  7.460  (7.35-7.45)  H  06/13/18  21:10    


 


POC ABG pCO2  32.3  (35-45)  L  06/13/18  21:10    


 


POC ABG pO2  92  ()   06/13/18  21:10    


 


POC ABG HCO3  23.0   06/13/18  21:10    


 


POC ABG Total CO2  24   06/13/18  21:10    


 


POC ABG O2 Sat  98   06/13/18  21:10    





PT/INR, D-dimer











PT  15.4 Sec. (12.2-14.9)  H  06/13/18  20:26    


 


INR  1.16  (0.87-1.13)  H  06/13/18  20:26    








Abnormal lab findings: 


 Abnormal Labs











  06/13/18 06/13/18 06/13/18





  20:05 20:26 20:26


 


WBC   17.1 H 


 


RDW   12.9 L 


 


Plt Count   475 H 


 


Lymph % (Auto)   7.7 L 


 


Mono % (Auto)   7.6 H 


 


Mono #   1.3 H 


 


Seg Neutrophils %   84.4 H 


 


Seg Neutrophils #   14.5 H 


 


PT    15.4 H


 


INR    1.16 H


 


POC ABG pH   


 


POC ABG pCO2   


 


Sodium   


 


Potassium   


 


Chloride   


 


Carbon Dioxide   


 


BUN   


 


Creatinine   


 


Glucose   


 


POC Glucose   


 


Lactic Acid   


 


Calcium   


 


Magnesium   


 


AST   


 


Total Creatine Kinase   


 


CK-MB (CK-2)   


 


Urine WBC (Auto)  8.0 H  


 


Salicylates   


 


Acetaminophen   














  06/13/18 06/13/18 06/13/18





  20:26 20:26 20:53


 


WBC   


 


RDW   


 


Plt Count   


 


Lymph % (Auto)   


 


Mono % (Auto)   


 


Mono #   


 


Seg Neutrophils %   


 


Seg Neutrophils #   


 


PT   


 


INR   


 


POC ABG pH   


 


POC ABG pCO2   


 


Sodium  151 H  


 


Potassium   


 


Chloride  112.5 H  


 


Carbon Dioxide  21 L  


 


BUN  37 H  


 


Creatinine   


 


Glucose  159 H  


 


POC Glucose   


 


Lactic Acid   3.10 H* 


 


Calcium   


 


Magnesium    2.40 H


 


AST  113 H  


 


Total Creatine Kinase    5716 H


 


CK-MB (CK-2)   


 


Urine WBC (Auto)   


 


Salicylates   


 


Acetaminophen   














  06/13/18 06/13/18 06/13/18





  20:53 20:53 21:10


 


WBC   


 


RDW   


 


Plt Count   


 


Lymph % (Auto)   


 


Mono % (Auto)   


 


Mono #   


 


Seg Neutrophils %   


 


Seg Neutrophils #   


 


PT   


 


INR   


 


POC ABG pH    7.460 H


 


POC ABG pCO2    32.3 L


 


Sodium   


 


Potassium   


 


Chloride   


 


Carbon Dioxide   


 


BUN   


 


Creatinine   


 


Glucose   


 


POC Glucose   


 


Lactic Acid   


 


Calcium   


 


Magnesium   


 


AST   


 


Total Creatine Kinase   


 


CK-MB (CK-2)   


 


Urine WBC (Auto)   


 


Salicylates  < 0.3 L  


 


Acetaminophen   < 5.0 L 














  06/14/18 06/14/18 06/14/18





  01:39 04:25 04:25


 


WBC   19.6 H 


 


RDW   12.5 L 


 


Plt Count   


 


Lymph % (Auto)   


 


Mono % (Auto)   9.4 H 


 


[place] #   1.8 H 


 


Seg Neutrophils %   74.2 H 


 


Seg Neutrophils #   14.5 H 


 


PT   


 


INR   


 


POC ABG pH   


 


POC ABG pCO2   


 


Sodium    154 H


 


Potassium   


 


Chloride    118.0 H


 


Carbon Dioxide    21 L


 


BUN    25 H


 


Creatinine   


 


Glucose    117 H


 


POC Glucose   


 


Lactic Acid   


 


Calcium    8.1 L


 


Magnesium   


 


AST   


 


Total Creatine Kinase  5392 H  


 


CK-MB (CK-2)  40.1 H  


 


Urine WBC (Auto)   


 


Salicylates   


 


Acetaminophen   














  06/14/18 06/14/18 06/14/18





  11:46 13:00 17:02


 


WBC   


 


RDW   


 


Plt Count   


 


Lymph % (Auto)   


 


Mono % (Auto)   


 


Mono #   


 


Seg Neutrophils %   


 


Seg Neutrophils #   


 


PT   


 


INR   


 


POC ABG pH   


 


POC ABG pCO2   


 


Sodium   


 


Potassium   


 


Chloride   


 


Carbon Dioxide   


 


BUN   


 


Creatinine   


 


Glucose   


 


POC Glucose  117 H   106 H


 


Lactic Acid   


 


Calcium   


 


Magnesium   


 


AST   


 


Total Creatine Kinase   3884 H 


 


CK-MB (CK-2)   21.0 H 


 


Urine WBC (Auto)   


 


Salicylates   


 


Acetaminophen   














  06/15/18 06/15/18 06/15/18





  05:40 15:32 21:26


 


WBC   


 


RDW   


 


Plt Count   


 


Lymph % (Auto)   


 


Mono % (Auto)   


 


Mono #   


 


Seg Neutrophils %   


 


Seg Neutrophils #   


 


PT   


 


INR   


 


POC ABG pH   


 


POC ABG pCO2   


 


Sodium   


 


Potassium   3.4 L 


 


Chloride   


 


Carbon Dioxide   


 


BUN   5 L 


 


Creatinine   0.6 L 


 


Glucose   


 


POC Glucose  110 H   116 H


 


Lactic Acid   


 


Calcium   


 


Magnesium   


 


AST   


 


Total Creatine Kinase   


 


CK-MB (CK-2)   


 


Urine WBC (Auto)   


 


Salicylates   


 


Acetaminophen   














  06/16/18 06/16/18 06/16/18





  05:23 05:23 12:17


 


WBC  12.2 H  


 


RDW  12.5 L  


 


Plt Count   


 


Lymph % (Auto)   


 


Mono % (Auto)   


 


Mono #   


 


Seg Neutrophils %   


 


Seg Neutrophils #   


 


PT   


 


INR   


 


POC ABG pH   


 


POC ABG pCO2   


 


Sodium   135 L 


 


Potassium   3.0 L 


 


Chloride   96.2 L 


 


Carbon Dioxide   


 


BUN   4 L 


 


Creatinine   0.5 L 


 


Glucose   107 H 


 


POC Glucose    136 H


 


Lactic Acid   


 


Calcium   8.3 L 


 


Magnesium   


 


AST   


 


Total Creatine Kinase   


 


CK-MB (CK-2)   


 


Urine WBC (Auto)   


 


Salicylates   


 


Acetaminophen   














  06/17/18





  00:00


 


WBC 


 


RDW 


 


Plt Count 


 


Lymph % (Auto) 


 


Mono % (Auto) 


 


Mono # 


 


Seg Neutrophils % 


 


Seg Neutrophils # 


 


PT 


 


INR 


 


POC ABG pH 


 


POC ABG pCO2 


 


Sodium 


 


Potassium 


 


Chloride 


 


Carbon Dioxide 


 


BUN 


 


Creatinine 


 


Glucose 


 


POC Glucose  112 H


 


Lactic Acid 


 


Calcium 


 


Magnesium 


 


AST 


 


Total Creatine Kinase 


 


CK-MB (CK-2) 


 


Urine WBC (Auto) 


 


Salicylates 


 


Acetaminophen 











Allied health notes reviewed: nursing decreased strength

## 2021-04-29 NOTE — PROGRESS NOTE
Assessment and Plan








Acute encephalopathy.


Systemic inflammatory response syndrome.


Rhabdomyolysis.


Hypernatremia.


Elevated serum transaminases.


History of bipolar disorder.


History of schizophrenia.


Mild metabolic acidosis





- encephalopathy improved


- stopped free water as hypernatremia resolved


- continue enteral nutrition


- continue swallow evaluation


- continue aspiration precautions


- get psychiatry evaluation and adjust meds per their recommendations


- stopped antibiotics and following clinically


- continue GI & VTE prophylaxis


- PT/OT evaluation ongoing





.... 25'








Subjective


Date of service: 06/16/18


Principal diagnosis: Acute Encephalopathy; SIRS; Hypernatremia; Rhabdomyolysis; 

Bipolar Disorder


Interval history: 





Patient is seen today for: Acute Encephalopathy; SIRS; Hypernatremia; 

Rhabdomyolysis; Bipolar Disorder





Seen and examined at bedside; 24hour events reviewed; nursing and respiratory 

care staff consulted; no adverse overnight events reported to me; resting in bed

; more alert but still with tangential thought pattern and incoherent verbage. 

No N/V/F/C





Objective


 Vital Signs - 12hr











  06/16/18 06/16/18





  07:29 10:00


 


Temperature 98.1 F 


 


Pulse Rate 105 H 


 


Respiratory 19 





Rate  


 


Blood Pressure 136/78 


 


O2 Sat by Pulse 98 99





Oximetry  











Constitutional: no acute distress, other (middle aged AAF; normocephalic and 

atraumatic in no acute respiratory distress)


Eyes: non-icteric


ENT: oropharynx moist, other (mallampatti II)


Neck: supple, no lymphadenopathy, no JVD, other (no thyromegaly)


Effort: mildly labored


Ascultation: Bilateral: clear


Percussion: Bilateral: not dull


Cardiovascular: regular rate and rhythm, other (no R/M)


Gastrointestinal: normoactive bowel sounds, soft, non-tender, non-distended, 

other (No HSM)


Integumentary: other (left thigh tender indurated 4X6 cm area improving)


Extremities: no cyanosis, no edema, pulses normal, no ischemia or petechiae


Neurologic: non-focal exam, pupils equal and round, CN II-XII normal, motor 

strength normal and


Psychiatric: other (bipolar; schizoid)


CBC and BMP: 


 06/16/18 05:23





 06/16/18 05:23


ABG, PT/INR, D-dimer: 


ABG











POC ABG pH  7.460  (7.35-7.45)  H  06/13/18  21:10    


 


POC ABG pCO2  32.3  (35-45)  L  06/13/18  21:10    


 


POC ABG pO2  92  ()   06/13/18  21:10    


 


POC ABG HCO3  23.0   06/13/18  21:10    


 


POC ABG Total CO2  24   06/13/18  21:10    


 


POC ABG O2 Sat  98   06/13/18  21:10    





PT/INR, D-dimer











PT  15.4 Sec. (12.2-14.9)  H  06/13/18  20:26    


 


INR  1.16  (0.87-1.13)  H  06/13/18  20:26    








Abnormal lab findings: 


 Abnormal Labs











  06/13/18 06/13/18 06/13/18





  20:05 20:26 20:26


 


WBC   17.1 H 


 


RDW   12.9 L 


 


Plt Count   475 H 


 


Lymph % (Auto)   7.7 L 


 


Mono % (Auto)   7.6 H 


 


Mono #   1.3 H 


 


Seg Neutrophils %   84.4 H 


 


Seg Neutrophils #   14.5 H 


 


PT    15.4 H


 


INR    1.16 H


 


POC ABG pH   


 


POC ABG pCO2   


 


Sodium   


 


Potassium   


 


Chloride   


 


Carbon Dioxide   


 


BUN   


 


Creatinine   


 


Glucose   


 


POC Glucose   


 


Lactic Acid   


 


Calcium   


 


Magnesium   


 


AST   


 


Total Creatine Kinase   


 


CK-MB (CK-2)   


 


Urine WBC (Auto)  8.0 H  


 


Salicylates   


 


Acetaminophen   














  06/13/18 06/13/18 06/13/18





  20:26 20:26 20:53


 


WBC   


 


RDW   


 


Plt Count   


 


Lymph % (Auto)   


 


Mono % (Auto)   


 


Mono #   


 


Seg Neutrophils %   


 


Seg Neutrophils #   


 


PT   


 


INR   


 


POC ABG pH   


 


POC ABG pCO2   


 


Sodium  151 H  


 


Potassium   


 


Chloride  112.5 H  


 


Carbon Dioxide  21 L  


 


BUN  37 H  


 


Creatinine   


 


Glucose  159 H  


 


POC Glucose   


 


Lactic Acid   3.10 H* 


 


Calcium   


 


Magnesium    2.40 H


 


AST  113 H  


 


Total Creatine Kinase    5716 H


 


CK-MB (CK-2)   


 


Urine WBC (Auto)   


 


Salicylates   


 


Acetaminophen   














  06/13/18 06/13/18 06/13/18





  20:53 20:53 21:10


 


WBC   


 


RDW   


 


Plt Count   


 


Lymph % (Auto)   


 


Mono % (Auto)   


 


Mono #   


 


Seg Neutrophils %   


 


Seg Neutrophils #   


 


PT   


 


INR   


 


POC ABG pH    7.460 H


 


POC ABG pCO2    32.3 L


 


Sodium   


 


Potassium   


 


Chloride   


 


Carbon Dioxide   


 


BUN   


 


Creatinine   


 


Glucose   


 


POC Glucose   


 


Lactic Acid   


 


Calcium   


 


Magnesium   


 


AST   


 


Total Creatine Kinase   


 


CK-MB (CK-2)   


 


Urine WBC (Auto)   


 


Salicylates  < 0.3 L  


 


Acetaminophen   < 5.0 L 














  06/14/18 06/14/18 06/14/18





  01:39 04:25 04:25


 


WBC   19.6 H 


 


RDW   12.5 L 


 


Plt Count   


 


Lymph % (Auto)   


 


Mono % (Auto)   9.4 H 


 


[place] #   1.8 H 


 


Seg Neutrophils %   74.2 H 


 


Seg Neutrophils #   14.5 H 


 


PT   


 


INR   


 


POC ABG pH   


 


POC ABG pCO2   


 


Sodium    154 H


 


Potassium   


 


Chloride    118.0 H


 


Carbon Dioxide    21 L


 


BUN    25 H


 


Creatinine   


 


Glucose    117 H


 


POC Glucose   


 


Lactic Acid   


 


Calcium    8.1 L


 


Magnesium   


 


AST   


 


Total Creatine Kinase  5392 H  


 


CK-MB (CK-2)  40.1 H  


 


Urine WBC (Auto)   


 


Salicylates   


 


Acetaminophen   














  06/14/18 06/14/18 06/14/18





  11:46 13:00 17:02


 


WBC   


 


RDW   


 


Plt Count   


 


Lymph % (Auto)   


 


Mono % (Auto)   


 


Mono #   


 


Seg Neutrophils %   


 


Seg Neutrophils #   


 


PT   


 


INR   


 


POC ABG pH   


 


POC ABG pCO2   


 


Sodium   


 


Potassium   


 


Chloride   


 


Carbon Dioxide   


 


BUN   


 


Creatinine   


 


Glucose   


 


POC Glucose  117 H   106 H


 


Lactic Acid   


 


Calcium   


 


Magnesium   


 


AST   


 


Total Creatine Kinase   3884 H 


 


CK-MB (CK-2)   21.0 H 


 


Urine WBC (Auto)   


 


Salicylates   


 


Acetaminophen   














  06/15/18 06/15/18 06/15/18





  05:40 15:32 21:26


 


WBC   


 


RDW   


 


Plt Count   


 


Lymph % (Auto)   


 


Mono % (Auto)   


 


Mono #   


 


Seg Neutrophils %   


 


Seg Neutrophils #   


 


PT   


 


INR   


 


POC ABG pH   


 


POC ABG pCO2   


 


Sodium   


 


Potassium   3.4 L 


 


Chloride   


 


Carbon Dioxide   


 


BUN   5 L 


 


Creatinine   0.6 L 


 


Glucose   


 


POC Glucose  110 H   116 H


 


Lactic Acid   


 


Calcium   


 


Magnesium   


 


AST   


 


Total Creatine Kinase   


 


CK-MB (CK-2)   


 


Urine WBC (Auto)   


 


Salicylates   


 


Acetaminophen   














  06/16/18 06/16/18 06/16/18





  05:23 05:23 12:17


 


WBC  12.2 H  


 


RDW  12.5 L  


 


Plt Count   


 


Lymph % (Auto)   


 


Mono % (Auto)   


 


Mono #   


 


Seg Neutrophils %   


 


Seg Neutrophils #   


 


PT   


 


INR   


 


POC ABG pH   


 


POC ABG pCO2   


 


Sodium   135 L 


 


Potassium   3.0 L 


 


Chloride   96.2 L 


 


Carbon Dioxide   


 


BUN   4 L 


 


Creatinine   0.5 L 


 


Glucose   107 H 


 


POC Glucose    136 H


 


Lactic Acid   


 


Calcium   8.3 L 


 


Magnesium   


 


AST   


 


Total Creatine Kinase   


 


CK-MB (CK-2)   


 


Urine WBC (Auto)   


 


Salicylates   


 


Acetaminophen   











Allied health notes reviewed: nursing Home